# Patient Record
Sex: MALE | Race: WHITE | NOT HISPANIC OR LATINO | URBAN - METROPOLITAN AREA
[De-identification: names, ages, dates, MRNs, and addresses within clinical notes are randomized per-mention and may not be internally consistent; named-entity substitution may affect disease eponyms.]

---

## 2020-12-21 ENCOUNTER — EMERGENCY (EMERGENCY)
Facility: HOSPITAL | Age: 60
LOS: 0 days | Discharge: HOME | End: 2020-12-21
Attending: EMERGENCY MEDICINE | Admitting: EMERGENCY MEDICINE
Payer: COMMERCIAL

## 2020-12-21 VITALS
OXYGEN SATURATION: 98 % | RESPIRATION RATE: 20 BRPM | DIASTOLIC BLOOD PRESSURE: 104 MMHG | HEART RATE: 54 BPM | HEIGHT: 70 IN | SYSTOLIC BLOOD PRESSURE: 197 MMHG | WEIGHT: 197.09 LBS | TEMPERATURE: 96 F

## 2020-12-21 VITALS
HEART RATE: 52 BPM | DIASTOLIC BLOOD PRESSURE: 97 MMHG | SYSTOLIC BLOOD PRESSURE: 169 MMHG | OXYGEN SATURATION: 97 % | TEMPERATURE: 97 F | RESPIRATION RATE: 18 BRPM

## 2020-12-21 DIAGNOSIS — M25.512 PAIN IN LEFT SHOULDER: ICD-10-CM

## 2020-12-21 DIAGNOSIS — R07.9 CHEST PAIN, UNSPECIFIED: ICD-10-CM

## 2020-12-21 DIAGNOSIS — Z85.810 PERSONAL HISTORY OF MALIGNANT NEOPLASM OF TONGUE: Chronic | ICD-10-CM

## 2020-12-21 DIAGNOSIS — I10 ESSENTIAL (PRIMARY) HYPERTENSION: ICD-10-CM

## 2020-12-21 DIAGNOSIS — Z90.09 ACQUIRED ABSENCE OF OTHER PART OF HEAD AND NECK: Chronic | ICD-10-CM

## 2020-12-21 DIAGNOSIS — Z98.890 OTHER SPECIFIED POSTPROCEDURAL STATES: ICD-10-CM

## 2020-12-21 DIAGNOSIS — Z87.891 PERSONAL HISTORY OF NICOTINE DEPENDENCE: ICD-10-CM

## 2020-12-21 DIAGNOSIS — R04.0 EPISTAXIS: ICD-10-CM

## 2020-12-21 PROCEDURE — 30903 CONTROL OF NOSEBLEED: CPT

## 2020-12-21 PROCEDURE — 99284 EMERGENCY DEPT VISIT MOD MDM: CPT | Mod: 25

## 2020-12-21 PROCEDURE — 93010 ELECTROCARDIOGRAM REPORT: CPT

## 2020-12-21 RX ORDER — CLOPIDOGREL BISULFATE 75 MG/1
0 TABLET, FILM COATED ORAL
Qty: 0 | Refills: 0 | DISCHARGE

## 2020-12-21 NOTE — ED PROVIDER NOTE - CLINICAL SUMMARY MEDICAL DECISION MAKING FREE TEXT BOX
59 yo male PMH HTN, throat/neck cancer s/o resection, has vascular neck stent, taking ASA and Plavix c/o nose bleed for about 5 hours.  No associated complaints,, upon arrival in ED reported mild chest discomfort which has resolved spontaneously, denies any complaints at present.  ECG non-ischemic.  + bleeding form the left naris.  Nose was packed, patient was observed in ED for a few hours, no bleeding following packing.  Advised to follow up with ENT in 1-2 days, , strict return precautions given.  Patient verbalized understanding and is amenable with the plan.

## 2020-12-21 NOTE — ED ADULT NURSE NOTE - NSIMPLEMENTINTERV_GEN_ALL_ED
Implemented All Fall with Harm Risk Interventions:  [place] to call system. Call bell, personal items and telephone within reach. Instruct patient to call for assistance. Room bathroom lighting operational. Non-slip footwear when patient is off stretcher. Physically safe environment: no spills, clutter or unnecessary equipment. Stretcher in lowest position, wheels locked, appropriate side rails in place. Provide visual cue, wrist band, yellow gown, etc. Monitor gait and stability. Monitor for mental status changes and reorient to person, place, and time. Review medications for side effects contributing to fall risk. Reinforce activity limits and safety measures with patient and family. Provide visual clues: red socks.

## 2020-12-21 NOTE — ED PROVIDER NOTE - OBJECTIVE STATEMENT
60yM pmhx thyroid cancer that spread to throat/tongue s/p resection 2012, TIA and s/p carotid stent 11/2020 on ASA, plavix c/o nosebleed x5hrs; constant, stable; LT nostril>RT; began spontaneously no known inciting factor or digital trauma, states he attempted pressure, neosporin, hydrogen peroxide w/o relief. Feeling healthy and well prior to sx; denies fever/chills, n/v/d, abd pain, SOB.  Pt also states shortly PTA he developed pain in his chest that has since traveled to his LT shoulder, getting better, exacerbated by moving his arm, reproducible on palpation.

## 2020-12-21 NOTE — ED PROVIDER NOTE - PROGRESS NOTE DETAILS
AG: epistaxis controlled w/ rapid rhino LT nostril, no bleeding RT nostril, blood/clots cleared out of oropharynx w/ no further bleeding. Patient to be discharged from ED. Verbal instructions given, including instructions to return to ED immediately for any new, worsening, or concerning symptoms. Patient endorsed understanding. Written discharge instructions additionally given, including follow-up plan.  Patient was given opportunity to ask questions.

## 2020-12-21 NOTE — ED ADULT NURSE NOTE - OBJECTIVE STATEMENT
Patient presents to ER in [facility] complaining of nose bleed for the past 5 hours. Patient currently takes ASA and Plavix, had a mini stroke on 11/23/2020 in which patient had a carotid stent placed on 11/30/2020. Patient now complaining of sternal chest pain for the past 30 minutes prior to arrival. Patient presents to ER in [facility] complaining of nose bleed for the past 5 hours. Patient currently takes ASA and Plavix, had a mini stroke on 11/23/2020 in which patient had a carotid stent placed on 11/30/2020. Patient now complaining of sternal chest pain for the past 30 minutes prior to arrival. Patient unsure of all medications and medication dosages.

## 2020-12-21 NOTE — ED PROVIDER NOTE - PRO INTERPRETER NEED 2
TRANSFER - IN REPORT:    Verbal report received from [facility] RN(name) on [address]  being received from ED(unit) for routine progression of care      Report consisted of patients Situation, Background, Assessment and   Recommendations(SBAR). Information from the following report(s) SBAR, Kardex and ED Summary was reviewed with the receiving nurse. Opportunity for questions and clarification was provided. Assessment completed upon patients arrival to unit and care assumed. Dual skin assessment completed with James Iqbal RN:  Pt's skin generally c/d/i, no evidence of breakdown or pressure ulcers. Heels & sacrum intact. Pt bathed & placed on monitor, allevyn applied. NAD & VSS, will continue to monitor. English

## 2020-12-21 NOTE — ED ADULT TRIAGE NOTE - CHIEF COMPLAINT QUOTE
Pt reports nose bleed for 5 hours. On ASA and PLavix for mini stroke on 11/23. Stent placed right sided of neck 11/30. Pt now reports misternal chest pain starting 30 min prior to arrival

## 2020-12-21 NOTE — ED PROVIDER NOTE - ATTENDING CONTRIBUTION TO CARE
61 yo male PMH HTN, throat/neck cancer s/o resection, has vascular neck stent, taking ASA and Plavix c/o nose bleed for about 5 hours.  No associated complaints,, upon arrival in ED reported mild chest discomfort which has resolved spontaneously, denies any complaints at present.  ECG non-ischemic.  + bleeding form the left naris.  Nose was packed, patient was observed in ED for a few hours, no bleeding following packing.  Advised to follow up with ENT in 1-2 days, , strict return precautions given.  Patient verbalized understanding and is amenable with the plan.

## 2020-12-21 NOTE — ED PROVIDER NOTE - PATIENT PORTAL LINK FT
You can access the FollowMyHealth Patient Portal offered by [facility] by registering at the following website: [email or web address]. By joining [facility]’s FollowMyHealth portal, you will also be able to view your health information using other applications (apps) compatible with our system.

## 2020-12-21 NOTE — ED PROVIDER NOTE - NSFOLLOWUPINSTRUCTIONS_ED_ALL_ED_FT
Please follow up with the ENT doctor that did your surgery, or the one below, WITHIN 48 HOURS to have the Rapid Rhino removed. If you cannot follow up with an ENT doctor in 48 hours return to the emergency department to have it removed. Return to the emergency department sooner for any new or worsening symptoms.    Epistaxis (nosebleed)    Nosebleeds are common and can be caused by many conditions, such as injury, infections, dry mucous membranes or dry climate, medicines, nose picking, and home heating and cooling systems. Try controlling your nosebleed by pinching your nose continuously for at least 10 minutes. Avoid lying down while you are having a nosebleed. Sit up and lean forward. Avoid blowing or sniffing your nose for a number of hours after having a nosebleed. Resume your normal activities as you are able, but avoid straining, lifting, or bending at the waist for several days. Maintain humidity in your home by using less air conditioning or by using a humidifier.     If your nose was packed by your health care provider, try to maintain the pack inside of your nose until your health care provider removes it. If a balloon catheter was used to pack your nose, do not cut or remove it unless your health care provider has instructed you to do that.     Aspirin and blood thinners make bleeding more likely. If you are prescribed these medicines and you suffer from nosebleeds, ask your health care provider if you should stop taking the medicines or adjust the dose. Do not stop medicines unless directed by your health care provider     SEEK IMMEDIATE MEDICAL CARE IF YOU HAVE THE FOLLOWING SYMPTOMS: nosebleed lasting longer than 20 minutes, unusual bleeding from or bruising on other parts of your body, dizziness or lightheadedness, nosebleed occurring after a head injury, or fever.

## 2020-12-21 NOTE — ED PROVIDER NOTE - NS ED ROS FT
Review of Systems:  	•	CONSTITUTIONAL - no fever, no diaphoresis  	•	SKIN - no rash, no lesions  	•	HEMATOLOGIC - no bleeding, no bruising  	•	EYES - no discharge, no injection  	•	ENT - no sore throat, +epistaxis  	•	RESPIRATORY - no shortness of breath, no cough  	•	CARDIAC - no chest pain, no palpitations  	•	GI - no abd pain, no nausea, no vomiting, no diarrhea  	•	GENITO-URINARY - no dysuria, no hematuria  	•	MUSCULOSKELETAL - +shoulder pain, no muscle aches  	•	NEUROLOGIC - no dizziness, no headache

## 2020-12-21 NOTE — ED PROVIDER NOTE - PHYSICAL EXAMINATION
Vital Signs: Reviewed  GEN: alert, NAD, speaks full sentences  HEAD:  normocephalic, atraumatic  EYES:  PERRLA; conjunctivae without injection, drainage or discharge  ENMT:  active bleeding anterior LT nostril, no active bleeding RT nostril; mouth moist without ulcerations or lesions s/p surgical manipulation; posterior oropharynx w/o cascading bleeding  NECK:  supple  CARDIAC:  regular rate, normal S1 and S2, no murmurs  RESP:  respiratory rate and effort appear normal for age; lungs are clear to auscultation bilaterally; no rales or wheezes  ABDOMEN:  soft, nontender, nondistended  MUSCULOSKELETAL/NEURO: supraspinatus muscle tenderness; normal movement, normal tone  SKIN:  normal skin color for age and race, well-perfused; warm and dry

## 2020-12-21 NOTE — ED PROVIDER NOTE - CARE PROVIDER_API CALL
Lasha Glass (MD)  [facility]  [address], [address] [place] [address]  Phone: [phone]  Fax: [phone]  Follow Up Time: Urgent

## 2020-12-31 NOTE — ED PROCEDURE NOTE - CPROC ED POST PROC CARE GUIDE1
f/u ENT/Verbal/written post procedure instructions were given to patient/caregiver./Instructed patient/caregiver regarding signs and symptoms of infection.

## 2020-12-31 NOTE — ED PROCEDURE NOTE - CPROC ED NASAL DETAIL1
soaked in TXA/The source of the bleeding was clearly identified./The anatomic location was packed./The bleeding stopped.

## 2021-05-30 NOTE — ED PROVIDER NOTE - WET READ LAUNCH FT
EGD Operative Report  Patient Name: Atha Stage  YOB: 1962  MRN: [id number]  Procedure: Esophagogastroduodenoscopy (EGD)  With gastric biopsies  Pre-operative Diagnosis & Indication: ISABEL  Post-operative Diagnosis:   1.  Gastritis deep sedation was administered.  Once adequate sedation was achieved, a bite block was placed and a lubricated tip of an Olympus video upper endoscope was inserted through the oropharynx and gently manipulated through the esophagus into the stomach and the pylori. • Duodenum:   o The entire examined duodenum was normal.  Biopsies with cold forceps were obtained.      Impression: Upper GI source of ISABEL may be explained by gastritis, or erythema in the hernia, likely from mechanical etiology in setting of Par There are no Wet Read(s) to document.

## 2023-06-20 PROBLEM — C73 MALIGNANT NEOPLASM OF THYROID GLAND: Chronic | Status: ACTIVE | Noted: 2020-12-21

## 2023-06-27 PROBLEM — Z00.00 ENCOUNTER FOR PREVENTIVE HEALTH EXAMINATION: Status: ACTIVE | Noted: 2023-06-27

## 2023-06-30 ENCOUNTER — EMERGENCY (EMERGENCY)
Facility: HOSPITAL | Age: 63
LOS: 1 days | Discharge: HOME | End: 2023-06-30
Attending: STUDENT IN AN ORGANIZED HEALTH CARE EDUCATION/TRAINING PROGRAM | Admitting: STUDENT IN AN ORGANIZED HEALTH CARE EDUCATION/TRAINING PROGRAM
Payer: COMMERCIAL

## 2023-06-30 ENCOUNTER — NON-APPOINTMENT (OUTPATIENT)
Age: 63
End: 2023-06-30

## 2023-06-30 ENCOUNTER — APPOINTMENT (OUTPATIENT)
Dept: NEUROSURGERY | Facility: CLINIC | Age: 63
End: 2023-06-30
Payer: COMMERCIAL

## 2023-06-30 VITALS
TEMPERATURE: 98 F | HEIGHT: 70 IN | DIASTOLIC BLOOD PRESSURE: 82 MMHG | HEART RATE: 55 BPM | OXYGEN SATURATION: 96 % | RESPIRATION RATE: 17 BRPM | WEIGHT: 179.9 LBS | SYSTOLIC BLOOD PRESSURE: 180 MMHG

## 2023-06-30 VITALS
OXYGEN SATURATION: 95 % | RESPIRATION RATE: 16 BRPM | SYSTOLIC BLOOD PRESSURE: 182 MMHG | HEART RATE: 52 BPM | TEMPERATURE: 99 F | DIASTOLIC BLOOD PRESSURE: 89 MMHG

## 2023-06-30 VITALS
TEMPERATURE: 98.3 F | HEIGHT: 70 IN | DIASTOLIC BLOOD PRESSURE: 92 MMHG | WEIGHT: 180 LBS | BODY MASS INDEX: 25.77 KG/M2 | SYSTOLIC BLOOD PRESSURE: 164 MMHG | OXYGEN SATURATION: 97 % | HEART RATE: 56 BPM

## 2023-06-30 DIAGNOSIS — M50.223 OTHER CERVICAL DISC DISPLACEMENT AT C6-C7 LEVEL: ICD-10-CM

## 2023-06-30 DIAGNOSIS — R63.4 ABNORMAL WEIGHT LOSS: ICD-10-CM

## 2023-06-30 DIAGNOSIS — M47.812 SPONDYLOSIS WITHOUT MYELOPATHY OR RADICULOPATHY, CERVICAL REGION: ICD-10-CM

## 2023-06-30 DIAGNOSIS — M54.2 CERVICALGIA: ICD-10-CM

## 2023-06-30 DIAGNOSIS — Z85.810 PERSONAL HISTORY OF MALIGNANT NEOPLASM OF TONGUE: Chronic | ICD-10-CM

## 2023-06-30 DIAGNOSIS — Z98.890 OTHER SPECIFIED POSTPROCEDURAL STATES: Chronic | ICD-10-CM

## 2023-06-30 DIAGNOSIS — M43.13 SPONDYLOLISTHESIS, CERVICOTHORACIC REGION: ICD-10-CM

## 2023-06-30 DIAGNOSIS — Z85.810 PERSONAL HISTORY OF MALIGNANT NEOPLASM OF TONGUE: ICD-10-CM

## 2023-06-30 DIAGNOSIS — R63.0 ANOREXIA: ICD-10-CM

## 2023-06-30 DIAGNOSIS — R50.9 FEVER, UNSPECIFIED: ICD-10-CM

## 2023-06-30 DIAGNOSIS — M50.31 OTHER CERVICAL DISC DEGENERATION, HIGH CERVICAL REGION: ICD-10-CM

## 2023-06-30 DIAGNOSIS — M48.02 SPINAL STENOSIS, CERVICAL REGION: ICD-10-CM

## 2023-06-30 DIAGNOSIS — Z85.850 PERSONAL HISTORY OF MALIGNANT NEOPLASM OF THYROID: ICD-10-CM

## 2023-06-30 DIAGNOSIS — E11.9 TYPE 2 DIABETES MELLITUS WITHOUT COMPLICATIONS: ICD-10-CM

## 2023-06-30 DIAGNOSIS — Z90.09 ACQUIRED ABSENCE OF OTHER PART OF HEAD AND NECK: Chronic | ICD-10-CM

## 2023-06-30 LAB
ANION GAP SERPL CALC-SCNC: 11 MMOL/L — SIGNIFICANT CHANGE UP (ref 5–17)
APTT BLD: 38.6 SEC — HIGH (ref 27.5–35.5)
BASOPHILS # BLD AUTO: 0.05 K/UL — SIGNIFICANT CHANGE UP (ref 0–0.2)
BASOPHILS NFR BLD AUTO: 0.5 % — SIGNIFICANT CHANGE UP (ref 0–2)
BUN SERPL-MCNC: 14 MG/DL — SIGNIFICANT CHANGE UP (ref 7–23)
CALCIUM SERPL-MCNC: 10.1 MG/DL — SIGNIFICANT CHANGE UP (ref 8.4–10.5)
CHLORIDE SERPL-SCNC: 94 MMOL/L — LOW (ref 96–108)
CO2 SERPL-SCNC: 29 MMOL/L — SIGNIFICANT CHANGE UP (ref 22–31)
CREAT SERPL-MCNC: 0.75 MG/DL — SIGNIFICANT CHANGE UP (ref 0.5–1.3)
CRP SERPL-MCNC: 33.3 MG/L — HIGH (ref 0–4)
EGFR: 103 ML/MIN/1.73M2 — SIGNIFICANT CHANGE UP
EOSINOPHIL # BLD AUTO: 0.11 K/UL — SIGNIFICANT CHANGE UP (ref 0–0.5)
EOSINOPHIL NFR BLD AUTO: 1.1 % — SIGNIFICANT CHANGE UP (ref 0–6)
ERYTHROCYTE [SEDIMENTATION RATE] IN BLOOD: 51 MM/HR — HIGH
GLUCOSE SERPL-MCNC: 288 MG/DL — HIGH (ref 70–99)
HCT VFR BLD CALC: 42.5 % — SIGNIFICANT CHANGE UP (ref 39–50)
HGB BLD-MCNC: 14.2 G/DL — SIGNIFICANT CHANGE UP (ref 13–17)
IMM GRANULOCYTES NFR BLD AUTO: 0.3 % — SIGNIFICANT CHANGE UP (ref 0–0.9)
INR BLD: 1.07 — SIGNIFICANT CHANGE UP (ref 0.88–1.16)
LYMPHOCYTES # BLD AUTO: 1.31 K/UL — SIGNIFICANT CHANGE UP (ref 1–3.3)
LYMPHOCYTES # BLD AUTO: 12.8 % — LOW (ref 13–44)
MCHC RBC-ENTMCNC: 28.6 PG — SIGNIFICANT CHANGE UP (ref 27–34)
MCHC RBC-ENTMCNC: 33.4 GM/DL — SIGNIFICANT CHANGE UP (ref 32–36)
MCV RBC AUTO: 85.7 FL — SIGNIFICANT CHANGE UP (ref 80–100)
MONOCYTES # BLD AUTO: 1.08 K/UL — HIGH (ref 0–0.9)
MONOCYTES NFR BLD AUTO: 10.6 % — SIGNIFICANT CHANGE UP (ref 2–14)
NEUTROPHILS # BLD AUTO: 7.65 K/UL — HIGH (ref 1.8–7.4)
NEUTROPHILS NFR BLD AUTO: 74.7 % — SIGNIFICANT CHANGE UP (ref 43–77)
NRBC # BLD: 0 /100 WBCS — SIGNIFICANT CHANGE UP (ref 0–0)
PLATELET # BLD AUTO: 334 K/UL — SIGNIFICANT CHANGE UP (ref 150–400)
POTASSIUM SERPL-MCNC: 4.6 MMOL/L — SIGNIFICANT CHANGE UP (ref 3.5–5.3)
POTASSIUM SERPL-SCNC: 4.6 MMOL/L — SIGNIFICANT CHANGE UP (ref 3.5–5.3)
PROTHROM AB SERPL-ACNC: 12.7 SEC — SIGNIFICANT CHANGE UP (ref 10.5–13.4)
RBC # BLD: 4.96 M/UL — SIGNIFICANT CHANGE UP (ref 4.2–5.8)
RBC # FLD: 12.8 % — SIGNIFICANT CHANGE UP (ref 10.3–14.5)
SODIUM SERPL-SCNC: 134 MMOL/L — LOW (ref 135–145)
WBC # BLD: 10.23 K/UL — SIGNIFICANT CHANGE UP (ref 3.8–10.5)
WBC # FLD AUTO: 10.23 K/UL — SIGNIFICANT CHANGE UP (ref 3.8–10.5)

## 2023-06-30 PROCEDURE — 72125 CT NECK SPINE W/O DYE: CPT | Mod: MG

## 2023-06-30 PROCEDURE — 87040 BLOOD CULTURE FOR BACTERIA: CPT

## 2023-06-30 PROCEDURE — 72156 MRI NECK SPINE W/O & W/DYE: CPT | Mod: 26,MG

## 2023-06-30 PROCEDURE — G1004: CPT

## 2023-06-30 PROCEDURE — 99205 OFFICE O/P NEW HI 60 MIN: CPT

## 2023-06-30 PROCEDURE — 99284 EMERGENCY DEPT VISIT MOD MDM: CPT | Mod: 25

## 2023-06-30 PROCEDURE — 96374 THER/PROPH/DIAG INJ IV PUSH: CPT | Mod: XU

## 2023-06-30 PROCEDURE — 36415 COLL VENOUS BLD VENIPUNCTURE: CPT

## 2023-06-30 PROCEDURE — 99285 EMERGENCY DEPT VISIT HI MDM: CPT

## 2023-06-30 PROCEDURE — 80048 BASIC METABOLIC PNL TOTAL CA: CPT

## 2023-06-30 PROCEDURE — 72125 CT NECK SPINE W/O DYE: CPT | Mod: 26,MG

## 2023-06-30 PROCEDURE — 85610 PROTHROMBIN TIME: CPT

## 2023-06-30 PROCEDURE — 85025 COMPLETE CBC W/AUTO DIFF WBC: CPT

## 2023-06-30 PROCEDURE — 85652 RBC SED RATE AUTOMATED: CPT

## 2023-06-30 PROCEDURE — 96376 TX/PRO/DX INJ SAME DRUG ADON: CPT | Mod: XU

## 2023-06-30 PROCEDURE — 85730 THROMBOPLASTIN TIME PARTIAL: CPT

## 2023-06-30 PROCEDURE — A9585: CPT

## 2023-06-30 PROCEDURE — 72040 X-RAY EXAM NECK SPINE 2-3 VW: CPT | Mod: 26

## 2023-06-30 PROCEDURE — 72040 X-RAY EXAM NECK SPINE 2-3 VW: CPT

## 2023-06-30 PROCEDURE — 86140 C-REACTIVE PROTEIN: CPT

## 2023-06-30 PROCEDURE — 72156 MRI NECK SPINE W/O & W/DYE: CPT | Mod: MG

## 2023-06-30 RX ORDER — METHOCARBAMOL 500 MG/1
2 TABLET, FILM COATED ORAL
Qty: 40 | Refills: 0
Start: 2023-06-30 | End: 2023-07-04

## 2023-06-30 RX ORDER — MORPHINE SULFATE 50 MG/1
4 CAPSULE, EXTENDED RELEASE ORAL ONCE
Refills: 0 | Status: DISCONTINUED | OUTPATIENT
Start: 2023-06-30 | End: 2023-06-30

## 2023-06-30 RX ADMIN — MORPHINE SULFATE 4 MILLIGRAM(S): 50 CAPSULE, EXTENDED RELEASE ORAL at 17:00

## 2023-06-30 RX ADMIN — MORPHINE SULFATE 4 MILLIGRAM(S): 50 CAPSULE, EXTENDED RELEASE ORAL at 18:49

## 2023-06-30 RX ADMIN — MORPHINE SULFATE 4 MILLIGRAM(S): 50 CAPSULE, EXTENDED RELEASE ORAL at 13:58

## 2023-06-30 NOTE — ED ADULT NURSE NOTE - NSFALLUNIVINTERV_ED_ALL_ED
Bed/Stretcher in lowest position, wheels locked, appropriate side rails in place/Call bell, personal items and telephone in reach/Instruct patient to call for assistance before getting out of bed/chair/stretcher/Non-slip footwear applied when patient is off stretcher/[place] to call system/Physically safe environment - no spills, clutter or unnecessary equipment/Purposeful proactive rounding/Room/bathroom lighting operational, light cord in reach Bed/Stretcher in lowest position, wheels locked, appropriate side rails in place/Call bell, personal items and telephone in reach/Instruct patient to call for assistance before getting out of bed/chair/stretcher/Non-slip footwear applied when patient is off stretcher/[place] to call system/Physically safe environment - no spills, clutter or unnecessary equipment/Purposeful proactive rounding/Room/bathroom lighting operational, light cord in reach Bed/Stretcher in lowest position, wheels locked, appropriate side rails in place/Call bell, personal items and telephone in reach/Instruct patient to call for assistance before getting out of bed/chair/stretcher/Non-slip footwear applied when patient is off stretcher/[place] to call system/Physically safe environment - no spills, clutter or unnecessary equipment/Purposeful proactive rounding/Room/bathroom lighting operational, light cord in reach

## 2023-06-30 NOTE — CONSULT NOTE ADULT - SUBJECTIVE AND OBJECTIVE BOX
HISTORY OF PRESENT ILLNESS:   63 y/o M with pmh tongue cancer with thyroid metastases s/p resection 12 years ago, and DM presents with neck pain x 1 month. Patient reports that the pain started after he was working as a  and was stuck outside on a roof in windy and cold conditions. Patient reports that he had a fever after the incident as well. Patient denies trauma or injury. Pain is exacerbated with ROM of neck. Patient also admits to subjective fever 2-3 days ago, intermittent nausea x 3 days and over 20 lbs weight loss and loss of appetite since symptoms began. PT has been to OSH ED 2x, once on 6/2/23 and once on 6/3/23, had CTA head/neck which showed chronic lacunar infarcts within the basal ganglia and microvascular ischemic disease, no other acute findings. He had an MR C spine noncontrast on 6/12/23: which showed C3-4 mod canal stenosis and flattening of L ventral hemicord, C4-5 and C5-6 mild to mod canal stenosis and cord flattening, C6-7 slight effacement of thecal sac due to a shallow central disc protrusion, multilevel foraminal narrowing most significant at C3-4 on L and C4-5 on the R, impingement on respective exiting L C4 and R C5 nerve roots. Patient has also seen a neurologist and pain management provider outpatient. He reports that the only medication that he has taken that helps is oxycodone but it provides limited relief. He recieved a paraspinal muscular injection 2 weeks ago without relief and an epidural steroid injection 1.5 weeks ago that provided only temporary relief. Patient was referred to Dr. Triplett for further evaluation and upon arrival was directed to the ER for urgent imaging. Patient denies extremity numbness or weakness, gait imbalance, headache, vision changes, and radiation of pain. CT c-spine completed in ER.           PAST MEDICAL & SURGICAL HISTORY:  Tongue cancer      Diabetes      H/O tongue cancer        FAMILY HISTORY:      SOCIAL HISTORY:  unknown    Allergies    No Known Allergies    Intolerances        REVIEW OF SYSTEMS      General:	+recent weight loss    Skin/Breast:  intact  	  Ophthalmologic:  negative  	  ENMT:	negative    Respiratory and Thorax: no coughing, wheezing, recent URI  	  Cardiovascular: no chest pain, KEY    Gastrointestinal:	soft, non tender    Genitourinary: no frequency, dysuria    Musculoskeletal:	 +neck pain    Neurological:	see HPI    Psychiatric:	negative    Hematology/Lymphatics:	negative    Endocrine:  	negative    Allergic/Immunologic:  Negative      MEDICATIONS:  Antibiotics:    Neuro:    Anticoagulation:    OTHER:    IVF:      Vital Signs Last 24 Hrs  T(C): 36.7 (30 Jun 2023 11:28), Max: 36.7 (30 Jun 2023 11:28)  T(F): 98 (30 Jun 2023 11:28), Max: 98 (30 Jun 2023 11:28)  HR: 55 (30 Jun 2023 11:28) (55 - 55)  BP: 180/82 (30 Jun 2023 11:28) (180/82 - 180/82)  BP(mean): --  RR: 17 (30 Jun 2023 11:28) (17 - 17)  SpO2: 96% (30 Jun 2023 11:28) (96% - 96%)    Parameters below as of 30 Jun 2023 11:28  Patient On (Oxygen Delivery Method): room air        PHYSICAL EXAM:  General: patient seen laying supine in bed in NAD  Neuro: AAOx3, follows commands, opens eyes spontaneously, speech clear and fluent, CNII-XI grossly intact, face symmetric, no pronator drift,  strength 5/5 b/l upper extremities and lower extremities, sensation intact to light touch throughout  HEENT: PERRL, EOMI  Neck: supple, +midline tenderness to palpation of upper cervical spine, no paraspinal tenderness  Cardiac: RRR, S1S2  Pulmonary: chest rise symmetric  Abdomen: soft, nontender, nondistended  Ext: perfusing well  Skin: warm, dry        LABS:                        14.2   10.23 )-----------( 334      ( 30 Jun 2023 13:39 )             42.5     06-30    134<L>  |  94<L>  |  14  ----------------------------<  288<H>  4.6   |  29  |  0.75    Ca    10.1      30 Jun 2023 13:39      PT/INR - ( 30 Jun 2023 13:39 )   PT: 12.7 sec;   INR: 1.07          PTT - ( 30 Jun 2023 13:39 )  PTT:38.6 sec  Urinalysis Basic - ( 30 Jun 2023 13:39 )    Color: x / Appearance: x / SG: x / pH: x  Gluc: 288 mg/dL / Ketone: x  / Bili: x / Urobili: x   Blood: x / Protein: x / Nitrite: x   Leuk Esterase: x / RBC: x / WBC x   Sq Epi: x / Non Sq Epi: x / Bacteria: x      CULTURES:      RADIOLOGY & ADDITIONAL STUDIES:  < from: CT Cervical Spine No Cont (06.30.23 @ 14:16) >  No acute fracture or malalignment. Lucent lesions in the right lateral   mass of C1 and the tip of the dens is uncertain etiology. Recommend   further evaluation with dedicated MRI cervical spine with and without IV   contrast.    Multilevel cervical spondylosis most prominent at C3-4 with severe left   neural foraminal narrowing and mild to moderate spinal canal stenosis.   Additional mild to moderate degenerative changes as above.    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       Plan:  - Recommend MRI cervical spine with and without IV contrast and AP/lateral cervical spine xrays.    Pending discussion with Dr. Triplett   HISTORY OF PRESENT ILLNESS:   61 y/o M with pmh tongue cancer with thyroid metastases s/p resection 12 years ago, and DM presents with neck pain x 1 month. Patient reports that the pain started after he was working as a  and was stuck outside on a roof in windy and cold conditions. Patient reports that he had a fever after the incident as well. Patient denies trauma or injury. Pain is exacerbated with ROM of neck. Patient also admits to subjective fever 2-3 days ago, intermittent nausea x 3 days and over 20 lbs weight loss and loss of appetite since symptoms began. PT has been to OSH ED 2x, once on 6/2/23 and once on 6/3/23, had CTA head/neck which showed chronic lacunar infarcts within the basal ganglia and microvascular ischemic disease, no other acute findings. He had an MR C spine noncontrast on 6/12/23: which showed C3-4 mod canal stenosis and flattening of L ventral hemicord, C4-5 and C5-6 mild to mod canal stenosis and cord flattening, C6-7 slight effacement of thecal sac due to a shallow central disc protrusion, multilevel foraminal narrowing most significant at C3-4 on L and C4-5 on the R, impingement on respective exiting L C4 and R C5 nerve roots. Patient has also seen a neurologist and pain management provider outpatient. He reports that the only medication that he has taken that helps is oxycodone but it provides limited relief. He recieved a paraspinal muscular injection 2 weeks ago without relief and an epidural steroid injection 1.5 weeks ago that provided only temporary relief. Patient was referred to Dr. Triplett for further evaluation and upon arrival was directed to the ER for urgent imaging. Patient denies extremity numbness or weakness, gait imbalance, headache, vision changes, and radiation of pain. CT c-spine completed in ER.           PAST MEDICAL & SURGICAL HISTORY:  Tongue cancer      Diabetes      H/O tongue cancer        FAMILY HISTORY:      SOCIAL HISTORY:  unknown    Allergies    No Known Allergies    Intolerances        REVIEW OF SYSTEMS      General:	+recent weight loss    Skin/Breast:  intact  	  Ophthalmologic:  negative  	  ENMT:	negative    Respiratory and Thorax: no coughing, wheezing, recent URI  	  Cardiovascular: no chest pain, KEY    Gastrointestinal:	soft, non tender    Genitourinary: no frequency, dysuria    Musculoskeletal:	 +neck pain    Neurological:	see HPI    Psychiatric:	negative    Hematology/Lymphatics:	negative    Endocrine:  	negative    Allergic/Immunologic:  Negative      MEDICATIONS:  Antibiotics:    Neuro:    Anticoagulation:    OTHER:    IVF:      Vital Signs Last 24 Hrs  T(C): 36.7 (30 Jun 2023 11:28), Max: 36.7 (30 Jun 2023 11:28)  T(F): 98 (30 Jun 2023 11:28), Max: 98 (30 Jun 2023 11:28)  HR: 55 (30 Jun 2023 11:28) (55 - 55)  BP: 180/82 (30 Jun 2023 11:28) (180/82 - 180/82)  BP(mean): --  RR: 17 (30 Jun 2023 11:28) (17 - 17)  SpO2: 96% (30 Jun 2023 11:28) (96% - 96%)    Parameters below as of 30 Jun 2023 11:28  Patient On (Oxygen Delivery Method): room air        PHYSICAL EXAM:  General: patient seen laying supine in bed in NAD  Neuro: AAOx3, follows commands, opens eyes spontaneously, speech clear and fluent, CNII-XI grossly intact, face symmetric, no pronator drift,  strength 5/5 b/l upper extremities and lower extremities, sensation intact to light touch throughout  HEENT: PERRL, EOMI  Neck: supple, +midline tenderness to palpation of upper cervical spine, no paraspinal tenderness  Cardiac: RRR, S1S2  Pulmonary: chest rise symmetric  Abdomen: soft, nontender, nondistended  Ext: perfusing well  Skin: warm, dry        LABS:                        14.2   10.23 )-----------( 334      ( 30 Jun 2023 13:39 )             42.5     06-30    134<L>  |  94<L>  |  14  ----------------------------<  288<H>  4.6   |  29  |  0.75    Ca    10.1      30 Jun 2023 13:39      PT/INR - ( 30 Jun 2023 13:39 )   PT: 12.7 sec;   INR: 1.07          PTT - ( 30 Jun 2023 13:39 )  PTT:38.6 sec  Urinalysis Basic - ( 30 Jun 2023 13:39 )    Color: x / Appearance: x / SG: x / pH: x  Gluc: 288 mg/dL / Ketone: x  / Bili: x / Urobili: x   Blood: x / Protein: x / Nitrite: x   Leuk Esterase: x / RBC: x / WBC x   Sq Epi: x / Non Sq Epi: x / Bacteria: x      CULTURES:      RADIOLOGY & ADDITIONAL STUDIES:  < from: CT Cervical Spine No Cont (06.30.23 @ 14:16) >  No acute fracture or malalignment. Lucent lesions in the right lateral   mass of C1 and the tip of the dens is uncertain etiology. Recommend   further evaluation with dedicated MRI cervical spine with and without IV   contrast.    Multilevel cervical spondylosis most prominent at C3-4 with severe left   neural foraminal narrowing and mild to moderate spinal canal stenosis.   Additional mild to moderate degenerative changes as above.    < end of copied text >    Assessment:  61 y/o M with pmh tongue cancer with thyroid metastases s/p resection 12 years ago, and DM presents with neck pain x 1 month and intermittent fevers.       Plan:  - Recommend MRI cervical spine with and without IV contrast and AP/lateral cervical spine xrays.    Pending discussion with Dr. Triplett   HISTORY OF PRESENT ILLNESS:   63 y/o M with pmh tongue cancer with thyroid metastases s/p resection 12 years ago, and DM presents with neck pain x 1 month. Patient reports that the pain started after he was working as a  and was stuck outside on a roof in windy and cold conditions. Patient reports that he had a fever after the incident as well. Patient denies trauma or injury. Pain is exacerbated with ROM of neck. Patient also admits to subjective fever 2-3 days ago, intermittent nausea x 3 days and over 20 lbs weight loss and loss of appetite since symptoms began. PT has been to OSH ED 2x, once on 6/2/23 and once on 6/3/23, had CTA head/neck which showed chronic lacunar infarcts within the basal ganglia and microvascular ischemic disease, no other acute findings. He had an MR C spine noncontrast on 6/12/23: which showed C3-4 mod canal stenosis and flattening of L ventral hemicord, C4-5 and C5-6 mild to mod canal stenosis and cord flattening, C6-7 slight effacement of thecal sac due to a shallow central disc protrusion, multilevel foraminal narrowing most significant at C3-4 on L and C4-5 on the R, impingement on respective exiting L C4 and R C5 nerve roots. Patient has also seen a neurologist and pain management provider outpatient. He reports that the only medication that he has taken that helps is oxycodone but it provides limited relief. He recieved a paraspinal muscular injection 2 weeks ago without relief and an epidural steroid injection 1.5 weeks ago that provided only temporary relief. Patient was referred to Dr. Triplett for further evaluation and upon arrival was directed to the ER for urgent imaging. Patient denies extremity numbness or weakness, gait imbalance, headache, vision changes, and radiation of pain. CT c-spine completed in ER.           PAST MEDICAL & SURGICAL HISTORY:  Tongue cancer      Diabetes      H/O tongue cancer        FAMILY HISTORY:      SOCIAL HISTORY:  unknown    Allergies    No Known Allergies    Intolerances        REVIEW OF SYSTEMS      General:	+recent weight loss    Skin/Breast:  intact  	  Ophthalmologic:  negative  	  ENMT:	negative    Respiratory and Thorax: no coughing, wheezing, recent URI  	  Cardiovascular: no chest pain, KEY    Gastrointestinal:	soft, non tender    Genitourinary: no frequency, dysuria    Musculoskeletal:	 +neck pain    Neurological:	see HPI    Psychiatric:	negative    Hematology/Lymphatics:	negative    Endocrine:  	negative    Allergic/Immunologic:  Negative      MEDICATIONS:  Antibiotics:    Neuro:    Anticoagulation:    OTHER:    IVF:      Vital Signs Last 24 Hrs  T(C): 36.7 (30 Jun 2023 11:28), Max: 36.7 (30 Jun 2023 11:28)  T(F): 98 (30 Jun 2023 11:28), Max: 98 (30 Jun 2023 11:28)  HR: 55 (30 Jun 2023 11:28) (55 - 55)  BP: 180/82 (30 Jun 2023 11:28) (180/82 - 180/82)  BP(mean): --  RR: 17 (30 Jun 2023 11:28) (17 - 17)  SpO2: 96% (30 Jun 2023 11:28) (96% - 96%)    Parameters below as of 30 Jun 2023 11:28  Patient On (Oxygen Delivery Method): room air        PHYSICAL EXAM:  General: patient seen laying supine in bed in NAD  Neuro: AAOx3, follows commands, opens eyes spontaneously, speech clear and fluent, CNII-XI grossly intact, face symmetric, no pronator drift,  strength 5/5 b/l upper extremities and lower extremities, sensation intact to light touch throughout  HEENT: PERRL, EOMI  Neck: supple, +midline tenderness to palpation of upper cervical spine, no paraspinal tenderness  Cardiac: RRR, S1S2  Pulmonary: chest rise symmetric  Abdomen: soft, nontender, nondistended  Ext: perfusing well  Skin: warm, dry        LABS:                        14.2   10.23 )-----------( 334      ( 30 Jun 2023 13:39 )             42.5     06-30    134<L>  |  94<L>  |  14  ----------------------------<  288<H>  4.6   |  29  |  0.75    Ca    10.1      30 Jun 2023 13:39      PT/INR - ( 30 Jun 2023 13:39 )   PT: 12.7 sec;   INR: 1.07          PTT - ( 30 Jun 2023 13:39 )  PTT:38.6 sec  Urinalysis Basic - ( 30 Jun 2023 13:39 )    Color: x / Appearance: x / SG: x / pH: x  Gluc: 288 mg/dL / Ketone: x  / Bili: x / Urobili: x   Blood: x / Protein: x / Nitrite: x   Leuk Esterase: x / RBC: x / WBC x   Sq Epi: x / Non Sq Epi: x / Bacteria: x      CULTURES:      RADIOLOGY & ADDITIONAL STUDIES:  < from: CT Cervical Spine No Cont (06.30.23 @ 14:16) >  No acute fracture or malalignment. Lucent lesions in the right lateral   mass of C1 and the tip of the dens is uncertain etiology. Recommend   further evaluation with dedicated MRI cervical spine with and without IV   contrast.    Multilevel cervical spondylosis most prominent at C3-4 with severe left   neural foraminal narrowing and mild to moderate spinal canal stenosis.   Additional mild to moderate degenerative changes as above.    < end of copied text >  < from: MR Cervical Spine w/wo IV Cont (06.30.23 @ 16:47) >    IMPRESSION: Findings suggestive of inflammatory arthropathy involving C1   and C2 joint space and right atlantooccipital joint and clinical   correlation is recommended. Multilevel degenerative disc disease with   spinal stenosis with mild cord compression at C3/4 as detailed above.    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       Plan:  - Recommended CT cervical spine, MRI cervical spine with and without IV contrast and AP/lateral cervical spine xrays, imaging reviewed with Dr. Triplett.   - Recommend to send ESR, CRP and blood cultures. Please fit patient in a cervical hard collar and have him follow up outpatient with Dr. Triplett next Wednesday in his office. Please provide patient with a prescription for a muscle relaxer and pain medications.     Please contact neurosurgery PA with any questions or concerns: [phone]    D/w Dr. Triplett   HISTORY OF PRESENT ILLNESS:   61 y/o M with pmh tongue cancer with thyroid metastases s/p resection 12 years ago, and DM presents with neck pain x 1 month. Patient reports that the pain started after he was working as a  and was stuck outside on a roof in windy and cold conditions. Patient reports that he had a fever after the incident as well. Patient denies trauma or injury. Pain is exacerbated with ROM of neck. Patient also admits to subjective fever 2-3 days ago, intermittent nausea x 3 days and over 20 lbs weight loss and loss of appetite since symptoms began. PT has been to OSH ED 2x, once on 6/2/23 and once on 6/3/23, had CTA head/neck which showed chronic lacunar infarcts within the basal ganglia and microvascular ischemic disease, no other acute findings. He had an MR C spine noncontrast on 6/12/23: which showed C3-4 mod canal stenosis and flattening of L ventral hemicord, C4-5 and C5-6 mild to mod canal stenosis and cord flattening, C6-7 slight effacement of thecal sac due to a shallow central disc protrusion, multilevel foraminal narrowing most significant at C3-4 on L and C4-5 on the R, impingement on respective exiting L C4 and R C5 nerve roots. Patient has also seen a neurologist and pain management provider outpatient. He reports that the only medication that he has taken that helps is oxycodone but it provides limited relief. He recieved a paraspinal muscular injection 2 weeks ago without relief and an epidural steroid injection 1.5 weeks ago that provided only temporary relief. Patient was referred to Dr. Triplett for further evaluation and upon arrival was directed to the ER for urgent imaging. Patient denies extremity numbness or weakness, gait imbalance, headache, vision changes, and radiation of pain. CT c-spine completed in ER.           PAST MEDICAL & SURGICAL HISTORY:  Tongue cancer      Diabetes      H/O tongue cancer        FAMILY HISTORY:      SOCIAL HISTORY:  unknown    Allergies    No Known Allergies    Intolerances        REVIEW OF SYSTEMS      General:	+recent weight loss    Skin/Breast:  intact  	  Ophthalmologic:  negative  	  ENMT:	negative    Respiratory and Thorax: no coughing, wheezing, recent URI  	  Cardiovascular: no chest pain, KEY    Gastrointestinal:	soft, non tender    Genitourinary: no frequency, dysuria    Musculoskeletal:	 +neck pain    Neurological:	see HPI    Psychiatric:	negative    Hematology/Lymphatics:	negative    Endocrine:  	negative    Allergic/Immunologic:  Negative      MEDICATIONS:  Antibiotics:    Neuro:    Anticoagulation:    OTHER:    IVF:      Vital Signs Last 24 Hrs  T(C): 36.7 (30 Jun 2023 11:28), Max: 36.7 (30 Jun 2023 11:28)  T(F): 98 (30 Jun 2023 11:28), Max: 98 (30 Jun 2023 11:28)  HR: 55 (30 Jun 2023 11:28) (55 - 55)  BP: 180/82 (30 Jun 2023 11:28) (180/82 - 180/82)  BP(mean): --  RR: 17 (30 Jun 2023 11:28) (17 - 17)  SpO2: 96% (30 Jun 2023 11:28) (96% - 96%)    Parameters below as of 30 Jun 2023 11:28  Patient On (Oxygen Delivery Method): room air        PHYSICAL EXAM:  General: patient seen laying supine in bed in NAD  Neuro: AAOx3, follows commands, opens eyes spontaneously, speech clear and fluent, CNII-XI grossly intact, face symmetric, no pronator drift,  strength 5/5 b/l upper extremities and lower extremities, sensation intact to light touch throughout  HEENT: PERRL, EOMI  Neck: supple, +midline tenderness to palpation of upper cervical spine, no paraspinal tenderness  Cardiac: RRR, S1S2  Pulmonary: chest rise symmetric  Abdomen: soft, nontender, nondistended  Ext: perfusing well  Skin: warm, dry        LABS:                        14.2   10.23 )-----------( 334      ( 30 Jun 2023 13:39 )             42.5     06-30    134<L>  |  94<L>  |  14  ----------------------------<  288<H>  4.6   |  29  |  0.75    Ca    10.1      30 Jun 2023 13:39      PT/INR - ( 30 Jun 2023 13:39 )   PT: 12.7 sec;   INR: 1.07          PTT - ( 30 Jun 2023 13:39 )  PTT:38.6 sec  Urinalysis Basic - ( 30 Jun 2023 13:39 )    Color: x / Appearance: x / SG: x / pH: x  Gluc: 288 mg/dL / Ketone: x  / Bili: x / Urobili: x   Blood: x / Protein: x / Nitrite: x   Leuk Esterase: x / RBC: x / WBC x   Sq Epi: x / Non Sq Epi: x / Bacteria: x      CULTURES:      RADIOLOGY & ADDITIONAL STUDIES:  < from: CT Cervical Spine No Cont (06.30.23 @ 14:16) >  No acute fracture or malalignment. Lucent lesions in the right lateral   mass of C1 and the tip of the dens is uncertain etiology. Recommend   further evaluation with dedicated MRI cervical spine with and without IV   contrast.    Multilevel cervical spondylosis most prominent at C3-4 with severe left   neural foraminal narrowing and mild to moderate spinal canal stenosis.   Additional mild to moderate degenerative changes as above.    < end of copied text >  < from: MR Cervical Spine w/wo IV Cont (06.30.23 @ 16:47) >    IMPRESSION: Findings suggestive of inflammatory arthropathy involving C1   and C2 joint space and right atlantooccipital joint and clinical   correlation is recommended. Multilevel degenerative disc disease with   spinal stenosis with mild cord compression at C3/4 as detailed above.    < end of copied text >    Assessment:  61 y/o M with pmh tongue cancer with thyroid metastases s/p resection 12 years ago, and DM presents with neck pain x 1 month and intermittent fevers.       Plan:  - Recommended CT cervical spine, MRI cervical spine with and without IV contrast and AP/lateral cervical spine xrays, imaging reviewed with Dr. Triplett.   - Recommend to send ESR, CRP and blood cultures. Please fit patient in a cervical hard collar and have him follow up outpatient with Dr. Triplett next Wednesday in his office. Please provide patient with a prescription for a muscle relaxer and pain medications.     Please contact neurosurgery PA with any questions or concerns: [phone]    D/w Dr. Triplett   HISTORY OF PRESENT ILLNESS:   63 y/o M with pmh tongue cancer with thyroid metastases s/p resection 12 years ago, and DM presents with neck pain x 1 month. Patient reports that the pain started after he was working as a  and was stuck outside on a roof in windy and cold conditions. Patient reports that he had a fever after the incident as well. Patient denies trauma or injury. Pain is exacerbated with ROM of neck. Patient also admits to subjective fever 2-3 days ago, intermittent nausea x 3 days and over 20 lbs weight loss and loss of appetite since symptoms began. PT has been to OSH ED 2x, once on 6/2/23 and once on 6/3/23, had CTA head/neck which showed chronic lacunar infarcts within the basal ganglia and microvascular ischemic disease, no other acute findings. He had an MR C spine noncontrast on 6/12/23: which showed C3-4 mod canal stenosis and flattening of L ventral hemicord, C4-5 and C5-6 mild to mod canal stenosis and cord flattening, C6-7 slight effacement of thecal sac due to a shallow central disc protrusion, multilevel foraminal narrowing most significant at C3-4 on L and C4-5 on the R, impingement on respective exiting L C4 and R C5 nerve roots. Patient has also seen a neurologist and pain management provider outpatient. He reports that the only medication that he has taken that helps is oxycodone but it provides limited relief. He recieved a paraspinal muscular injection 2 weeks ago without relief and an epidural steroid injection 1.5 weeks ago that provided only temporary relief. Patient was referred to Dr. Triplett for further evaluation and upon arrival was directed to the ER for urgent imaging. Patient denies extremity numbness or weakness, gait imbalance, headache, vision changes, and radiation of pain. CT c-spine completed in ER.           PAST MEDICAL & SURGICAL HISTORY:  Tongue cancer      Diabetes      H/O tongue cancer        FAMILY HISTORY:      SOCIAL HISTORY:  unknown    Allergies    No Known Allergies    Intolerances        REVIEW OF SYSTEMS      General:	+recent weight loss    Skin/Breast:  intact  	  Ophthalmologic:  negative  	  ENMT:	negative    Respiratory and Thorax: no coughing, wheezing, recent URI  	  Cardiovascular: no chest pain, KEY    Gastrointestinal:	soft, non tender    Genitourinary: no frequency, dysuria    Musculoskeletal:	 +neck pain    Neurological:	see HPI    Psychiatric:	negative    Hematology/Lymphatics:	negative    Endocrine:  	negative    Allergic/Immunologic:  Negative      MEDICATIONS:  Antibiotics:    Neuro:    Anticoagulation:    OTHER:    IVF:      Vital Signs Last 24 Hrs  T(C): 36.7 (30 Jun 2023 11:28), Max: 36.7 (30 Jun 2023 11:28)  T(F): 98 (30 Jun 2023 11:28), Max: 98 (30 Jun 2023 11:28)  HR: 55 (30 Jun 2023 11:28) (55 - 55)  BP: 180/82 (30 Jun 2023 11:28) (180/82 - 180/82)  BP(mean): --  RR: 17 (30 Jun 2023 11:28) (17 - 17)  SpO2: 96% (30 Jun 2023 11:28) (96% - 96%)    Parameters below as of 30 Jun 2023 11:28  Patient On (Oxygen Delivery Method): room air        PHYSICAL EXAM:  General: patient seen laying supine in bed in NAD  Neuro: AAOx3, follows commands, opens eyes spontaneously, speech clear and fluent, CNII-XI grossly intact, face symmetric, no pronator drift,  strength 5/5 b/l upper extremities and lower extremities, sensation intact to light touch throughout  HEENT: PERRL, EOMI  Neck: supple, +midline tenderness to palpation of upper cervical spine, no paraspinal tenderness  Cardiac: RRR, S1S2  Pulmonary: chest rise symmetric  Abdomen: soft, nontender, nondistended  Ext: perfusing well  Skin: warm, dry        LABS:                        14.2   10.23 )-----------( 334      ( 30 Jun 2023 13:39 )             42.5     06-30    134<L>  |  94<L>  |  14  ----------------------------<  288<H>  4.6   |  29  |  0.75    Ca    10.1      30 Jun 2023 13:39      PT/INR - ( 30 Jun 2023 13:39 )   PT: 12.7 sec;   INR: 1.07          PTT - ( 30 Jun 2023 13:39 )  PTT:38.6 sec  Urinalysis Basic - ( 30 Jun 2023 13:39 )    Color: x / Appearance: x / SG: x / pH: x  Gluc: 288 mg/dL / Ketone: x  / Bili: x / Urobili: x   Blood: x / Protein: x / Nitrite: x   Leuk Esterase: x / RBC: x / WBC x   Sq Epi: x / Non Sq Epi: x / Bacteria: x      CULTURES:      RADIOLOGY & ADDITIONAL STUDIES:  < from: CT Cervical Spine No Cont (06.30.23 @ 14:16) >  No acute fracture or malalignment. Lucent lesions in the right lateral   mass of C1 and the tip of the dens is uncertain etiology. Recommend   further evaluation with dedicated MRI cervical spine with and without IV   contrast.    Multilevel cervical spondylosis most prominent at C3-4 with severe left   neural foraminal narrowing and mild to moderate spinal canal stenosis.   Additional mild to moderate degenerative changes as above.    < end of copied text >  < from: MR Cervical Spine w/wo IV Cont (06.30.23 @ 16:47) >    IMPRESSION: Findings suggestive of inflammatory arthropathy involving C1   and C2 joint space and right atlantooccipital joint and clinical   correlation is recommended. Multilevel degenerative disc disease with   spinal stenosis with mild cord compression at C3/4 as detailed above.    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       Plan:  - Recommended CT cervical spine, MRI cervical spine with and without IV contrast and AP/lateral cervical spine xrays, imaging reviewed with Dr. Triplett.   - Recommend to send ESR, CRP and blood cultures. Please fit patient in a cervical hard collar and have him follow up outpatient with Dr. Triplett next Wednesday in his office. Please provide patient with a prescription for a muscle relaxer and pain medications.     Please contact neurosurgery PA with any questions or concerns: [phone]    D/w Dr. Triplett

## 2023-06-30 NOTE — ED PROVIDER NOTE - NSFOLLOWUPINSTRUCTIONS_ED_ALL_ED_FT
Yes Thank you for visiting [facility] Emergency Department.      We saw you today for neck pain.  Please wear hard cervical collar at all times until you see neurosurgeon, Dr. Triplett again.  He would like to see you on Wednesday 7/5/23.  Please call his office to confirm time.    You may call [phone] (ask for Carrie) at [facility] [employer] - [address] [place] [phone] to try and arrange getting a cervical collar that is more properly fitted to your body if the collar you are wearing is uncomfortable.  They can see you on Monday.    You may try muscle relaxant as prescribed and as directed.  Please be careful mixing this with any remaining Oxycodone you have as this can also make you feel sleepy.     Additionally you may take ibuprofen (Motrin, Advil) 600 mg (3 regular tablets) every 6 hours as needed for pain.  Please take with food.  Stop taking if you develop abdominal pain, dark/ bloody stools.  Do not mix with other NSAIDS (ie. Naproxen, Aleve, Celecoxib).  You may also take acetaminophen (Tylenol) 650-975mg (2-3 regular tablets) or 500-1000mg (1-2 extra strength tablets) every 6 hours as needed for pain.  Do not exceed 4000 mg in 1 day. These medications may be bought over the counter.    I recommend alternating the Ibuprofen and Tylenol so you are getting medications around the clock.  For example take the Ibuprofen, then 3 hours later take the Tylenol, then 3 hours later take the Ibuprofen, and repeat as needed.    Please know that no emergency visit is complete without follow-up with your primary care provider in 1 week.  Please bring copies of all discharge papers and results and show to your doctor.      Please continue taking all previous medications as instructed unless we discussed otherwise.     I appreciated your patience and hope you feel better soon.     Return to ER immediately if you develop fevers, chills, chest pain, shortness of breath, worsening and/or any concerning symptoms. Thank you for visiting [facility] Emergency Department.      We saw you today for neck pain.  Please wear hard cervical collar at all times until you see neurosurgeon, Dr. Triplett again.  He would like to see you on Wednesday 7/5/23.  Please call his office to confirm time.    You may call [phone] (ask for Carrie) at [facility] [employer] - [address] [place] [phone] to try and arrange getting a cervical collar that is more properly fitted to your body if the collar you are wearing is uncomfortable.  They can see you on Monday.    You may try muscle relaxant as prescribed and as directed.  Please be careful mixing this with any remaining Oxycodone you have as this can also make you feel sleepy.     Additionally you may take ibuprofen (Motrin, Advil) 600 mg (3 regular tablets) every 6 hours as needed for pain.  Please take with food.  Stop taking if you develop abdominal pain, dark/ bloody stools.  Do not mix with other NSAIDS (ie. Naproxen, Aleve, Celecoxib).  You may also take acetaminophen (Tylenol) 650-975mg (2-3 regular tablets) or 500-1000mg (1-2 extra strength tablets) every 6 hours as needed for pain.  Do not exceed 4000 mg in 1 day. These medications may be bought over the counter.    I recommend alternating the Ibuprofen and Tylenol so you are getting medications around the clock.  For example take the Ibuprofen, then 3 hours later take the Tylenol, then 3 hours later take the Ibuprofen, and repeat as needed.    Please know that no emergency visit is complete without follow-up with your primary care provider in 1 week.  Please bring copies of all discharge papers and results and show to your doctor.      Please continue taking all previous medications as instructed unless we discussed otherwise.     I appreciated your patience and hope you feel better soon.     Return to ER immediately if you develop fevers, chills, chest pain, shortness of breath, worsening and/or any concerning symptoms. Thank you for visiting [facility] Emergency Department.      We saw you today for neck pain.  Please wear hard cervical collar at all times until you see neurosurgeon, Dr. Triplett again.  He would like to see you on Wednesday 7/5/23.  Please call his office to confirm time.    You may call [phone] (ask for Carrie) at [facility] [employer] - [address] [place] [address] to try and arrange getting a cervical collar that is more properly fitted to your body if the collar you are wearing is uncomfortable.  They can see you on Monday.    You may try muscle relaxant as prescribed and as directed.  Please be careful mixing this with any remaining Oxycodone you have as this can also make you feel sleepy.     Additionally you may take ibuprofen (Motrin, Advil) 600 mg (3 regular tablets) every 6 hours as needed for pain.  Please take with food.  Stop taking if you develop abdominal pain, dark/ bloody stools.  Do not mix with other NSAIDS (ie. Naproxen, Aleve, Celecoxib).  You may also take acetaminophen (Tylenol) 650-975mg (2-3 regular tablets) or 500-1000mg (1-2 extra strength tablets) every 6 hours as needed for pain.  Do not exceed 4000 mg in 1 day. These medications may be bought over the counter.    I recommend alternating the Ibuprofen and Tylenol so you are getting medications around the clock.  For example take the Ibuprofen, then 3 hours later take the Tylenol, then 3 hours later take the Ibuprofen, and repeat as needed.    Please know that no emergency visit is complete without follow-up with your primary care provider in 1 week.  Please bring copies of all discharge papers and results and show to your doctor.      Please continue taking all previous medications as instructed unless we discussed otherwise.     I appreciated your patience and hope you feel better soon.     Return to ER immediately if you develop fevers, chills, chest pain, shortness of breath, worsening and/or any concerning symptoms.

## 2023-06-30 NOTE — ED ADULT TRIAGE NOTE - CHIEF COMPLAINT QUOTE
Pt sent by MD Triplett for C spine imaging- MRI with contrast, CT. Pt reports having neck pain x Memorial Day weekend. Denies numbness/tingling, injury. PMH DM. Last took oxycodone at 5am.

## 2023-06-30 NOTE — ED PROVIDER NOTE - NS ED ROS FT
CONSTITUTIONAL: +fever, since resolved    RESPIRATORY: Denies SOB    CARDIOVASCULAR: Denies  chest pain.    GASTROINTESTINAL: Denies abdominal pain, nausea, vomiting and diarrhea.    MUSCULOSKELETAL: See HPI

## 2023-06-30 NOTE — ED PROVIDER NOTE - CLINICAL SUMMARY MEDICAL DECISION MAKING FREE TEXT BOX
61 y/o male w/ hx tongue ca with mets to thyroid s/p resection 12 yrs ago, DM p/w severe persistent neck pain x approx 1 mo.  Denies known trauma/ injury.  Notes thinks had fever x 2-3 days 1 mo ago, with symptom onset, but none since.  Has been to ED 2x, once on 6/2/23 and once on 6/3/23, had CTA head/neck which showed chronic lacunar infarcts within the basal ganglia and microvascular ischemic disease, no other acute findings.    Had MR C spine noncontrast on 6/12/23: which showed C3-4 mod canal stenosis and flattening of L ventral hemicord, C4-5 and C5-6 mild to mod canal stenosis and cord flattening, C6-7 slight effacement of thecal sac due to a shallow central disc protrusion, multilevel foraminal narrowing most significant at C3-4 on L and C4-5 on the R, impingement on respective exiting L C4 and R C5 nerve roots.  Pt saw pain mgmt and neuro, states had epidural injection on 6/27/23 without relief.  Has been taking Oxycodone 20 mg q6h without relief.  Saw neurosurgeon, Dr. Triplett, today who sent pt to ED requesting MR C spine with contrast, CT cervical spine, and AP/lat urgent xr.  Pt denies cp, sob, abd pain, n/v, numbness/tingling/weakness to ext.  Has reportedly lost 20 lbs over course of this past mo.    VS notable for /82, HR 55.  Afebrile  Pt appears uncomfortable.  Limited ROM to neck.  +generalized ttp to neck.  Reassuring neuro exam.  No appreciable deficits    Plan for preop labs, will consult neurosx  Plan to obtain imaging as requested by neurosx team  Pain meds, re-eval 61 y/o male w/ hx tongue ca with mets to thyroid s/p resection 12 yrs ago, DM p/w severe persistent neck pain x approx 1 mo.  Denies known trauma/ injury.  Notes thinks had fever x 2-3 days 1 mo ago, with symptom onset, but none since.  Has been to ED 2x, once on 6/2/23 and once on 6/3/23, had CTA head/neck which showed chronic lacunar infarcts within the basal ganglia and microvascular ischemic disease, no other acute findings.    Had MR C spine noncontrast on 6/12/23: which showed C3-4 mod canal stenosis and flattening of L ventral hemicord, C4-5 and C5-6 mild to mod canal stenosis and cord flattening, C6-7 slight effacement of thecal sac due to a shallow central disc protrusion, multilevel foraminal narrowing most significant at C3-4 on L and C4-5 on the R, impingement on respective exiting L C4 and R C5 nerve roots.  Pt saw pain mgmt and neuro, states had epidural injection on 6/27/23 without relief.  Has been taking Oxycodone 20 mg q6h without relief.  Saw neurosurgeon, Dr. Triplett, today who sent pt to ED requesting MR C spine with contrast, CT cervical spine, and AP/lat urgent xr.  Pt denies cp, sob, abd pain, n/v, numbness/tingling/weakness to ext.  Has reportedly lost 20 lbs over course of this past mo.    VS notable for /82, HR 55.  Afebrile  Pt appears uncomfortable.  Limited ROM to neck.  +generalized ttp to neck.  Reassuring neuro exam.  No appreciable deficits    Plan for preop labs, will consult neurosx  Plan to obtain imaging as requested by neurosx team  Pain meds, re-eval  --  Imaging reviewed by neurosurgery team/ Dr. Triplett.  Per NS recs - advised pt to be placed in hard C collar, addon ESR/CRP/blood cx, and will f/u with Dr. Triplett on Wed 7/5/23.  Pt placed in C-collar, pt feels comfortable and better in collar.  Discussed results with pt and instructions.  Pt given info to get C-collar fitted for him- advised to go to office on Monday for fit.  Pt feels comfortable with plan, will DC with stric return precautions. 63 y/o male w/ hx tongue ca with mets to thyroid s/p resection 12 yrs ago, DM p/w severe persistent neck pain x approx 1 mo.  Denies known trauma/ injury.  Notes thinks had fever x 2-3 days 1 mo ago, with symptom onset, but none since.  Has been to ED 2x, once on 6/2/23 and once on 6/3/23, had CTA head/neck which showed chronic lacunar infarcts within the basal ganglia and microvascular ischemic disease, no other acute findings.    Had MR C spine noncontrast on 6/12/23: which showed C3-4 mod canal stenosis and flattening of L ventral hemicord, C4-5 and C5-6 mild to mod canal stenosis and cord flattening, C6-7 slight effacement of thecal sac due to a shallow central disc protrusion, multilevel foraminal narrowing most significant at C3-4 on L and C4-5 on the R, impingement on respective exiting L C4 and R C5 nerve roots.  Pt saw pain mgmt and neuro, states had epidural injection on 6/27/23 without relief.  Has been taking Oxycodone 20 mg q6h without relief.  Saw neurosurgeon, Dr. Triplett, today who sent pt to ED requesting MR C spine with contrast, CT cervical spine, and AP/lat urgent xr.  Pt denies cp, sob, abd pain, n/v, numbness/tingling/weakness to ext.  Has reportedly lost 20 lbs over course of this past mo.    VS notable for /82, HR 55.  Afebrile  Pt appears uncomfortable.  Limited ROM to neck.  +generalized ttp to neck.  Reassuring neuro exam.  No appreciable deficits    Plan for preop labs, will consult neurosx  Plan to obtain imaging as requested by neurosx team  Pain meds, re-eval  --  Imaging reviewed by neurosurgery team/ Dr. Triplett.  Per NS recs - advised pt to be placed in hard C collar, addon ESR/CRP/blood cx, and will f/u with Dr. Triplett on Wed 7/5/23.  Pt placed in C-collar, pt feels comfortable and better in collar.  Discussed results with pt and instructions.  Pt given info to get C-collar fitted for him- advised to go to office on Monday for fit.  Pt feels comfortable with plan, will DC with stric return precautions.

## 2023-06-30 NOTE — ED PROVIDER NOTE - PHYSICAL EXAMINATION
CONSTITUTIONAL: Awake, alert.  Appears uncomfortable/ in pain    HEAD: Normocephalic, atraumatic.    EYES: Conjunctivae clear without exudates or hemorrhage. Sclera is non-icteric.    ENT: Normal appearing external ears, nose, mucous membranes moist.    NECK: supple, trachea midline.  Limited ROM present, generalized ttp present to neck    LUNGS:  No acute respiratory distress.  Non-tachypneic and non-labored.  Speaking in clear full sentences    MUSCULOSKELETAL:  Normal appearing extremities without obvious deformity, rash, ecchymosis, erythema.  No swelling.  Warm. No focal tenderness.  FROM b/l upper and lower extremities.  5/5 strength b/l upper and lower ext.  Sensation and motor function grossly intact.  Strong equal peripheral pulses b/l.   Cap refill < 2 b/l upper and lower ext.  All compartments soft.    SKIN: Skin in warm, dry and intact without rashes or lesions.  Appropriate color for ethnicity.    NEUROLOGICAL:  Awake, alert and oriented x 3.  Normal speech and cognition.  Face symmetric with equal sensation to light touch throughout, PERRL, EOMI, no nystagmus, hearing grossly equal and intact b/l, no tongue deviation, No gross motor deficit, 5/5 strength throughout, no gross sensory loss to light touch b/l upper and lower ext, normal movement.  No dysmetria on finger to nose testing.  Neg pronator drift.

## 2023-06-30 NOTE — ED ADULT NURSE REASSESSMENT NOTE - NS ED NURSE REASSESS COMMENT FT1
c collar applied per PA Hellerman orders pt medicated per orders and discussion about plan for dc and follow up took place

## 2023-06-30 NOTE — ED PROVIDER NOTE - OBJECTIVE STATEMENT
61 y/o male w/ hx tongue ca with mets to thyroid s/p resection 12 yrs ago, DM p/w severe persistent neck pain x approx 1 mo.  Denies known trauma/ injury.  Notes thinks had fever x 2-3 days 1 mo ago, with symptom onset, but none since.  Has been to ED 2x, once on 6/2/23 and once on 6/3/23, had CTA head/neck which showed chronic lacunar infarcts within the basal ganglia and microvascular ischemic disease, no other acute findings.    Had MR C spine noncontrast on 6/12/23: which showed C3-4 mod canal stenosis and flattening of L ventral hemicord, C4-5 and C5-6 mild to mod canal stenosis and cord flattening, C6-7 slight effacement of thecal sac due to a shallow central disc protrusion, multilevel foraminal narrowing most significant at C3-4 on L and C4-5 on the R, impingement on respective exiting L C4 and R C5 nerve roots.  Pt saw pain mgmt and neuro, states had epidural injection on 6/27/23 without relief.  Has been taking Oxycodone 20 mg q6h without relief.  Saw neurosurgeon, Dr. Triplett, today who sent pt to ED requesting MR C spine with contrast, CT cervical spine, and AP/lat urgent xr.  Pt denies cp, sob, abd pain, n/v, numbness/tingling/weakness to ext.  Has reportedly lost 20 lbs over course of this past mo. 63 y/o male w/ hx tongue ca with mets to thyroid s/p resection 12 yrs ago, DM p/w severe persistent neck pain x approx 1 mo.  Denies known trauma/ injury.  Notes thinks had fever x 2-3 days 1 mo ago, with symptom onset, but none since.  Has been to ED 2x, once on 6/2/23 and once on 6/3/23, had CTA head/neck which showed chronic lacunar infarcts within the basal ganglia and microvascular ischemic disease, no other acute findings.    Had MR C spine noncontrast on 6/12/23: which showed C3-4 mod canal stenosis and flattening of L ventral hemicord, C4-5 and C5-6 mild to mod canal stenosis and cord flattening, C6-7 slight effacement of thecal sac due to a shallow central disc protrusion, multilevel foraminal narrowing most significant at C3-4 on L and C4-5 on the R, impingement on respective exiting L C4 and R C5 nerve roots.  Pt saw pain mgmt and neuro, states had epidural injection on 6/27/23 without relief.  Has been taking Oxycodone 20 mg q6h without relief.  Saw neurosurgeon, Dr. Triplett, today who sent pt to ED requesting MR C spine with contrast, CT cervical spine, and AP/lat urgent xr.  Pt denies cp, sob, abd pain, n/v, numbness/tingling/weakness to ext.  Has reportedly lost 20 lbs over course of this past mo. 61 y/o male w/ hx tongue ca with mets to thyroid s/p resection 12 yrs ago, DM p/w severe persistent neck pain x approx 1 mo.  Denies known trauma/ injury.  Notes thinks had fever x 2-3 days 1 mo ago, with symptom onset, but none since.  Has been to OSH ED 2x, once on 6/2/23 and once on 6/3/23, had CTA head/neck which showed chronic lacunar infarcts within the basal ganglia and microvascular ischemic disease, no other acute findings.    Had MR C spine noncontrast on 6/12/23: which showed C3-4 mod canal stenosis and flattening of L ventral hemicord, C4-5 and C5-6 mild to mod canal stenosis and cord flattening, C6-7 slight effacement of thecal sac due to a shallow central disc protrusion, multilevel foraminal narrowing most significant at C3-4 on L and C4-5 on the R, impingement on respective exiting L C4 and R C5 nerve roots.  Pt saw pain mgmt and neuro, states had epidural injection on 6/27/23 without relief.  Has been taking Oxycodone 20 mg q6h without relief.  Saw neurosurgeon, Dr. Triplett, today who sent pt to ED requesting MR C spine with contrast, CT cervical spine, and AP/lat urgent xr.  Pt denies cp, sob, abd pain, n/v, numbness/tingling/weakness to ext.  Has reportedly lost 20 lbs over course of this past mo. 63 y/o male w/ hx tongue ca with mets to thyroid s/p resection 12 yrs ago, DM p/w severe persistent neck pain x approx 1 mo.  Denies known trauma/ injury.  Notes thinks had fever x 2-3 days 1 mo ago, with symptom onset, but none since.  Has been to OSH ED 2x, once on 6/2/23 and once on 6/3/23, had CTA head/neck which showed chronic lacunar infarcts within the basal ganglia and microvascular ischemic disease, no other acute findings.    Had MR C spine noncontrast on 6/12/23: which showed C3-4 mod canal stenosis and flattening of L ventral hemicord, C4-5 and C5-6 mild to mod canal stenosis and cord flattening, C6-7 slight effacement of thecal sac due to a shallow central disc protrusion, multilevel foraminal narrowing most significant at C3-4 on L and C4-5 on the R, impingement on respective exiting L C4 and R C5 nerve roots.  Pt saw pain mgmt and neuro, states had epidural injection on 6/27/23 without relief.  Has been taking Oxycodone 20 mg q6h without relief.  Saw neurosurgeon, Dr. Triplett, today who sent pt to ED requesting MR C spine with contrast, CT cervical spine, and AP/lat urgent xr.  Pt denies cp, sob, abd pain, n/v, numbness/tingling/weakness to ext.  Has reportedly lost 20 lbs over course of this past mo.

## 2023-06-30 NOTE — ED PROVIDER NOTE - PATIENT PORTAL LINK FT
You can access the FollowMyHealth Patient Portal offered by [facility] by registering at the following website: [email or web address] By joining [employer]’s FollowMyHealth portal, you will also be able to view your health information using other applications (apps) compatible with our system. You can access the FollowMyHealth Patient Portal offered by [facility] by registering at the following website: [email or web address] By joining [employer]’s FollowMyHealth portal, you will also be able to view your health information using other applications (apps) compatible with our system. You can access the FollowMyHealth Patient Portal offered by [facility] by registering at the following website: [email or web address] By joining [employer]’s FollowMyHealth portal, you will also be able to view your health information using other applications (apps) compatible with our system.

## 2023-06-30 NOTE — ED ADULT NURSE NOTE - OBJECTIVE STATEMENT
63 y/o M presents to the ED from his neurologist c/o neck pain with paper stating "go to ED for MRI with IV contrast". Pt is a poor historian. Per pt, he had a recent MRI without contrast that was normal and has had neck pain for months. Pt c/o pain in exam stating last oxycodone was at 5am and has worn off. On exam, A&Ox4, NAD, ambulatory on RA. Pt "stiff-like" in exam, not bending back or moving neck. 61 y/o M presents to the ED from his neurologist c/o neck pain with paper stating "go to ED for MRI with IV contrast". Pt is a poor historian. Per pt, he had a recent MRI without contrast that was normal and has had neck pain for months. Pt c/o pain in exam stating last oxycodone was at 5am and has worn off. On exam, A&Ox4, NAD, ambulatory on RA. Pt "stiff-like" in exam, not bending back or moving neck.

## 2023-06-30 NOTE — ED PROVIDER NOTE - NS ED ATTENDING STATEMENT MOD
This was a shared visit with the [facility]. I reviewed and verified the documentation and independently performed the documented:

## 2023-06-30 NOTE — ED ADULT NURSE REASSESSMENT NOTE - NS ED NURSE REASSESS COMMENT FT1
pt returned from MRI no distress noted at this time. re-positioned in chair for comfort, VSS, pending MRI results family at bedside both updated on plan of care and expectations managed. reports improved pain but still present at 5/10

## 2023-07-01 PROBLEM — M54.2 NECK PAIN, BILATERAL: Status: ACTIVE | Noted: 2023-07-01

## 2023-07-01 RX ORDER — ATORVASTATIN CALCIUM 20 MG/1
20 TABLET, FILM COATED ORAL
Refills: 0 | Status: ACTIVE | COMMUNITY

## 2023-07-01 RX ORDER — LEVOTHYROXINE SODIUM 137 UG/1
TABLET ORAL
Refills: 0 | Status: ACTIVE | COMMUNITY

## 2023-07-01 RX ORDER — GLIPIZIDE 2.5 MG/1
TABLET ORAL
Refills: 0 | Status: ACTIVE | COMMUNITY

## 2023-07-01 NOTE — PHYSICAL EXAM
[General Appearance - Alert] : alert [Oriented To Time, Place, And Person] : oriented to person, place, and time [Person] : oriented to person [Place] : oriented to place [Time] : oriented to time [Cranial Nerves Optic (II)] : visual acuity intact bilaterally,  pupils equal round and reactive to light [Cranial Nerves Trigeminal (V)] : facial sensation intact symmetrically [Cranial Nerves Facial (VII)] : face symmetrical [Cranial Nerves Vestibulocochlear (VIII)] : hearing was intact bilaterally [Motor Tone] : muscle tone was normal in all four extremities [Motor Strength] : muscle strength was normal in all four extremities [Balance] : balance was intact [C-Spine ___ (level)] : ~Ulevel [unfilled] cervical spine [Muscle Spasms, Bilateral] : bilateral muscle spasms [Restricted] : was restricted [Pain] : was painful [PERRL With Normal Accommodation] : pupils were equal in size, round, reactive to light, with normal accommodation [Full Visual Field] : full visual field [] : no respiratory distress [Heart Rate And Rhythm] : heart rate was normal and rhythm regular [Abnormal Walk] : normal gait [Skin Color & Pigmentation] : normal skin color and pigmentation

## 2023-07-01 NOTE — REVIEW OF SYSTEMS
[Hypersensitivity] : hypersensitivity [As Noted in HPI] : as noted in HPI [Joint Pain] : joint pain [Joint Stiffness] : joint stiffness [Negative] : Integumentary

## 2023-07-05 ENCOUNTER — APPOINTMENT (OUTPATIENT)
Dept: NEUROSURGERY | Facility: CLINIC | Age: 63
End: 2023-07-05
Payer: COMMERCIAL

## 2023-07-05 ENCOUNTER — APPOINTMENT (OUTPATIENT)
Dept: NEUROSURGERY | Facility: HOSPITAL | Age: 63
End: 2023-07-05

## 2023-07-05 ENCOUNTER — NON-APPOINTMENT (OUTPATIENT)
Age: 63
End: 2023-07-05

## 2023-07-05 VITALS
HEART RATE: 54 BPM | SYSTOLIC BLOOD PRESSURE: 130 MMHG | DIASTOLIC BLOOD PRESSURE: 80 MMHG | TEMPERATURE: 97.3 F | OXYGEN SATURATION: 65 %

## 2023-07-05 DIAGNOSIS — E78.5 HYPERLIPIDEMIA, UNSPECIFIED: ICD-10-CM

## 2023-07-05 DIAGNOSIS — C80.1 MALIGNANT (PRIMARY) NEOPLASM, UNSPECIFIED: ICD-10-CM

## 2023-07-05 DIAGNOSIS — E11.9 TYPE 2 DIABETES MELLITUS W/OUT COMPLICATIONS: ICD-10-CM

## 2023-07-05 PROBLEM — Z00.00 ENCOUNTER FOR PREVENTIVE HEALTH EXAMINATION: Status: ACTIVE | Noted: 2023-07-05

## 2023-07-05 LAB
CULTURE RESULTS: SIGNIFICANT CHANGE UP
SPECIMEN SOURCE: SIGNIFICANT CHANGE UP

## 2023-07-05 PROCEDURE — 99215 OFFICE O/P EST HI 40 MIN: CPT

## 2023-07-09 NOTE — REASON FOR VISIT
[Referred By: _________] : Patient was referred by VERONA [Spouse] : spouse [New Patient Visit] : a new patient visit

## 2023-07-09 NOTE — ADDENDUM
[FreeTextEntry1] : MRI and CT c-spine and reviewed. Concern for inflammatory spondylopathy. ESR/CRP inflammatory markers are elevated. WBC is low reducing suspicion for infection. No surgical intervention warranted at this time. Patient has been getting some relief with wearing a hard cervical collar. Recommend consultation with rheumatology. Physical therapy. Recommendations discussed with referring pain provider DeGregoris.

## 2023-07-09 NOTE — HISTORY OF PRESENT ILLNESS
[< 3 months] : less than 3 months [FreeTextEntry1] : Neck pain/stiffness [de-identified] : Vu is a 62 year old male who presents to the office accompanied by his wife. \par H/P and timeline of events obtained from pt and his wife.\par On May 26th 2023 pt began developing neck/shoulder pain and stiffness with a gradual onset between 24-48 hours. Pt denies any accident/event precluding the onset of pain. He presented on May 28th to [facility] as his pain had become unmanageable at home with OTC medications. While in the ER CT/MRI of cervical spine were taken and pt was d/c home after r/o meningitis. Pt returned to ER ([facility]) as he was, again, unable to manage his pain. He was given medication for pain and nausea, and referred to Neurologist (Dr. Caballero). During initial visit with Neurologist, pt was prescribed physical therapy and oxycodone, both of which had minimal effect on pts pain. He was then referred to Dr. Rea for pain management, where he was given a steroid epidural (C7 and T1) on 6/20/23 which, per pt, had marked effect on pain and pt stated he felt "so good I even went and played tennis!". The effect of injection lasted 2-3 days before subsiding, and pts neck/shoulder pain and stiffness returned to the previous severity Pt was referred to Dr. Triplett by Dr. Rea for evaluation of symptoms and review of imaging. 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 Pt was diagnosed with CA in 2004 and also underwent chemo and radiation. \par \par Pt states that his pain/shoulder pain ranges from moderate to severe and is constant. Equal level of pain bilaterally, with no radiation/radiculopathy. to or arms. No numbness/tingling. Pt endorses extremely limited range of motion and is unable to turn his head left/right due to pain. He is able to slightly move his head up/down but this range of motion is also severely limited. Pts wife stated he had a low grade fever 2 days before the onset of pain/stiffness, but otherwise denies fever/chills. No vision changes or hearing loss noted. No bladder or bowel symptoms. Pt does endorse lack of appetite and weight loss r/t pain and limited ROM. \par \par Upon assessment, pts posture appears extremely rigid and stiff. Pts posterior neck/cervical spine is sensitive and tender to palpation. \par \par MRI Head/Neck w/o contrast and CT head/neck taken at [facility] on 5/28/23 reviewed by Dr. Triplett and is unremarkable for any abnormalities that would cause pts type or severity of pain/immobility. \par \par Pt was advised to go immediately to [facility] ER for MRI with Contrast of head/neck for evaluation.

## 2023-07-09 NOTE — DATA REVIEWED
[de-identified] : ACC: [id number] EXAM:  CT CERVICAL SPINE   ORDERED BY: SAMANTHA HELLERMAN \par \par PROCEDURE DATE:  06/30/2023  \par \par \par \par INTERPRETATION:  PROCEDURE: CT cervical spine without contrast\par \par INDICATION: Severe neck pain\par \par TECHNIQUE: Multiple axial sections were obtained from the mid orbits to \par the sternoclavicular joint. Sagittal and coronal reformats were obtained \par from the axial data set. The images were reviewed in soft tissue and bone \par windows.\par \par COMPARISON: None\par \par FINDINGS: The CT exam demonstrates loss of the normal cervical lordosis \par which may be secondary to positioning. The vertebral body heights are \par maintained. There is multilevel mild disc space narrowing. Multiple \par prominent anterior osteophytes are noted.. There is a stent that appears \par to be within the right common carotid artery and proximal right internal \par carotid artery. There are surgical clips within the right neck.. There \par are lucent lesions involving the tip of the dens (series 7 image 18) and \par the right lateral mass of C1 (series 6 image 51).\par \par At the C2/3 level, there is a central disc protrusion without significant \par spinal canal stenosis or neural foraminal narrowing.\par \par At the C3/4 level, there is a disc bulge asymmetric to the left with mild \par to moderate spinal canal stenosis. There is uncovertebral and facet \par hypertrophy with severe left and mild to moderate right neural foraminal \par narrowing.\par \par At the C4/5 level, there is a right paracentral disc herniation resulting \par in mild to moderate spinal canal stenosis. There is uncovertebral and \par facet hypertrophy with moderate right and mild to moderate left neural \par foraminal narrowing.\par \par At the C5/6 level, there is posterior osseous ridging with a superimposed \par disc herniation resulting in mild spinal canal stenosis. There is \par uncovertebral and facet hypertrophy with mild right neural foraminal \par narrowing.\par \par At the C6/7 level, a vertebral spurring with mild bilateral neural \par foraminal narrowing without significant spinal canal stenosis.\par \par At the C7/T1 level, there is facet hypertrophy resulting in mild left \par neural foraminal narrowing..\par \par IMPRESSION:\par \par No acute fracture or malalignment. Lucent lesions in the right lateral \par mass of C1 and the tip of the dens is uncertain etiology. Recommend \par further evaluation with dedicated MRI cervical spine with and without IV \par contrast.\par \par Multilevel cervical spondylosis most prominent at C3-4 with severe left \par neural foraminal narrowing and mild to moderate spinal canal stenosis. \par Additional mild to moderate degenerative changes as above.\par \par --- End of Report ---\par \par \par \par \par \par HALLEY MARCANO MD; Attending Radiologist\par This document has been electronically signed. Jun 30 2023  2:35PM [de-identified] : ACC: [id number] EXAM:  MR SPINE CERVICAL WAW IC   ORDERED BY: HAIR \laquita HELLERMAN \par \par PROCEDURE DATE:  06/30/2023  \par \par \par \par INTERPRETATION:  PROCEDURE: MRI cervical spine with and without contrast\par \par INDICATION: Severe neck pain; rule out cord compression\par \par TECHNIQUE: Sagittal T1, T2, STIR and axial T2 and gradient echo images of \par the cervical spine were obtained. Following the intravenous \par administration of 8 ml of Gadavist, sagittal and axial T1-weighted images \par were obtained. Some of the images are degraded by motion artifact.\par \par COMPARISON: CT cervical spine 6/30/2023 at 2:10 PM\par \par FINDINGS: Please note the axial images do not cover the C1-C2 level.\par \par The MR examination demonstrates irregularity of the dens with surrounding \par soft tissue which may represent pannus on the cervicomedullary junction. \par There is STIR signal abnormality within the dens with diffuse \par enhancement. No fluid within the anterior atlantodens interval is noted \par is there widening. There is additional STIR/T2 signal abnormality \par involving the right occipital condyle and lateral mass of C1 (series 8 \par image 3) with diffuse enhancement which may extend to the adjacent soft \par tissues (series 8 image 1). The joint space appears narrowed as best seen \par on the coronal images from the CT. These findings may be secondary to \par underlying inflammatory arthropathy. Alternative considerations would \par include infection and less likely tumoral disease. Correlation with ESR, \par CRP, and WBC is recommended.\par \par There is approximately 2 mm of anterolisthesis of C7 on T1 and T1 on T2. \par There is mild loss of intervertebral disc space height at C4/5 and C5/6. \par The vertebral body heights and rest of the intervertebral disc spaces are \par maintained. The cervical and upper thoracic marrow signal as well as the \par clivus appears fatty replaced. Clinical correlation for radiation is \par recommended. No abnormal signal is identified within the cervical cord. \par The cerebellar tonsils are normal in position.\par \par At the C2/3 level, there is small central disc herniation. There are \par degenerative changes involving the left facet. There is no central spinal \par canal stenosis. There is mild left neural foramen narrowing.\par \par At the C3/4 level, there is osseous ridging with moderate-sized central \par disc herniation indenting the thecal sac. There is bilateral \par uncovertebral joint and degenerative facet disease (left greater than \par right) as well as ligamentum flavum hypertrophy. This combination results \par in mild cord compression with moderate central spinal canal stenosis \par There is moderate right and severe left neural foramen narrowing.\par \par At the C4/5 level, there is a large central disc herniation indenting the \par thecal sac. There is bilateral uncovertebral joint and degenerative facet \par disease as well as ligamentum flavum hypertrophy. This combination \par results in mild central spinal canal stenosis. There is severe right and \par moderate left neural foramen narrowing.\par \par At the C5/6 level, there is a moderate size central disc herniation \par indenting the thecal sac. There is bilateral uncovertebral joint and \par degenerative facet disease. This combination results in mild central \par spinal canal stenosis. There is mild bilateral neural foramen narrowing.\par \par At the C6/7 level, there is a small central disc herniation. There is \par bilateral uncovertebral joint and degenerative facet disease. There is no \par central spinal canal stenosis. There is mild bilateral neural foramen \par narrowing.\par \par At the C7/T1 level, there is uncovering of the intervertebral disc space \par with disc bulge. There is bilateral uncovertebral joint and degenerative \par facet disease. There is no central spinal canal stenosis. There is mild \par to moderate bilateral neural foramen narrowing.\par \par IMPRESSION: Findings suggestive of inflammatory arthropathy involving C1 \par and C2 joint space and right atlantooccipital joint and clinical \par correlation is recommended. Multilevel degenerative disc disease with \par spinal stenosis with mild cord compression at C3/4 as detailed above.\par \par --- End of Report ---\par \par \par \par \par \par MARIELY RIZO MD; Attending Radiologist\par This document has been electronically signed. Jun 30 2023  5:59PM [de-identified] : ACC: [id number] EXAM:  XR C SPINE AP LAT ONLY 2-3V   ORDERED BY: HAIR \par HELLERMAN \par \par PROCEDURE DATE:  06/30/2023  \par \par \par \par INTERPRETATION:  PROCEDURE: AP and lateral views (2) of the cervical spine\par \par INDICATION: Neck pain\par \par COMPARISON: None\par \par CORRELATION: CT cervical spine 6/30/2023\par \par FINDINGS: There is approximately 3 mm of grade 1 anterolisthesis of C7 on \par T1. There is mild loss of intervertebral disc space height at C4/5 and \par C5/6. The vertebral body heights and rest of the intervertebral disc \par spaces are maintained. There is anterior osteophytic lipping at the C4/5 \par through the C7/T1 levels. The prevertebral soft tissues are within normal \par limits. There is no fracture.\par \par Multiple clips are identified within the right neck and right floor of \par the mouth. A vascular stent is present within the right neck. Correlation \par with surgical history is recommended.\par \par IMPRESSION: Grade 1 anterolisthesis of C7 on T1. Cervical spondylosis.\par \par --- End of Report ---\par \par \par \par \par \par MARIELY RIZO MD; Attending Radiologist\par This document has been electronically signed. Jun 30 2023  6:24PM

## 2023-07-09 NOTE — ASSESSMENT
[FreeTextEntry1] : IMAGING from  reviewed by Dr. Triplett with patient and pts wife unremarkable for abnormalities related to pts symptoms.\par \par Pt advised to go immediately to [facility] ER for evaluation and MRI Head/Neck WITH Contrast. \par Office/Dr. Triplett to follow-up with pt once imaging is received and reviewed. \par \par Patient and patient's family verbalize agreement and understanding with plan of care.\par \par I, Dr. Triplett, personally performed the evaluation and management (E/M) services for this established patient who presents today with (a) new problem(s)/exacerbation of (an) existing condition(s).  That E/M includes conducting the examination, assessing all new/exacerbated conditions, and establishing a new plan of care.  Today, my RN, Lisa Foote, was here to observe my evaluation and management services for this new problem/exacerbated condition to be followed going forward.\par

## 2023-07-09 NOTE — HISTORY OF PRESENT ILLNESS
[< 3 months] : less than 3 months [FreeTextEntry1] : Neck pain/stiffness [de-identified] : Vu is a 62 year old male who presents to the office accompanied by his wife. \par H/P and timeline of events obtained from pt and his wife.\par On May 26th 2023 pt began developing neck/shoulder pain and stiffness with a gradual onset between 24-48 hours. Pt denies any accident/event precluding the onset of pain. He presented on May 28th to [facility] as his pain had become unmanageable at home with OTC medications. While in the ER CT/MRI of cervical spine were taken and pt was d/c home after r/o meningitis. Pt returned to ER ([facility] [place]) as he was, again, unable to manage his pain. He was given medication for pain and nausea, and referred to Neurologist (Dr. Caballero). During initial visit with Neurologist, pt was prescribed physical therapy and oxycodone, both of which had minimal effect on pts pain. He was then referred to Dr. Rea for pain management, where he was given a steroid epidural (C7 and T1) on 6/20/23 which, per pt, had marked effect on pain and pt stated he felt "so good I even went and played tennis!". The effect of injection lasted 2-3 days before subsiding, and pts neck/shoulder pain and stiffness returned to the previous severity Pt was referred to Dr. Triplett by Dr. Rea for evaluation of symptoms and review of imaging. 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 Pt was diagnosed with CA in 2004 and also underwent chemo and radiation. \par \par 6/30/23\par Pt states that his pain/shoulder pain ranges from moderate to severe and is constant. Equal level of pain bilaterally, with no radiation/radiculopathy. to or arms. No numbness/tingling. Pt endorses extremely limited range of motion and is unable to turn his head left/right due to pain. He is able to slightly move his head up/down but this range of motion is also severely limited. Pts wife stated he had a low grade fever 2 days before the onset of pain/stiffness, but otherwise denies fever/chills. No vision changes or hearing loss noted. No bladder or bowel symptoms. Pt does endorse lack of appetite and weight loss r/t pain and limited ROM. \par \par Upon assessment, pts posture appears extremely rigid and stiff. Pts posterior neck/cervical spine is sensitive and tender to palpation. \par \par MRI Head/Neck w/o contrast and CT head/neck taken at [facility] on 5/28/23 reviewed by Dr. Triplett and is unremarkable for any abnormalities that would cause pts type or severity of pain/immobility. \par \par Pt was advised to go immediately to [facility] ER for MRI with Contrast of head/neck for evaluation.

## 2023-07-09 NOTE — ADDENDUM
[FreeTextEntry1] : Patient unable to move neck. He has exquisite tenderness to posterior cervical palpation. MRI without contrast showing cervical stenosis but his exam is out of proportion to imaging findings. I am concerned for possible infectious etiology or fracture given the tenderness/severe pain and lack of neck mobility. Recommend patient proceed to ED for MRI C-spine with contrast and CT scan of C-spine.

## 2023-07-09 NOTE — REASON FOR VISIT
[Follow-Up: _____] : a [unfilled] follow-up visit [FreeTextEntry1] : He returns for ED follow up, Recommended CT cervical spine, MRI cervical spine with and without IV contrast and AP/lateral cervical spine xrays which showed C1-2 inflammatory arthropathy and C3-4 mild cord compression without signal changes. Inflammatory marker showed elevations- ESR (51), CRP (33.3) without systemic infection/ blood cultures (no growth om day 4).\par Neuro sx consult in ED recommended to continue to wear cervical hard collar and follow up today for outpatient evaluation.

## 2023-07-11 ENCOUNTER — APPOINTMENT (OUTPATIENT)
Dept: RHEUMATOLOGY | Facility: CLINIC | Age: 63
End: 2023-07-11

## 2023-07-12 ENCOUNTER — OUTPATIENT (OUTPATIENT)
Dept: OUTPATIENT SERVICES | Facility: HOSPITAL | Age: 63
LOS: 1 days | End: 2023-07-12
Payer: COMMERCIAL

## 2023-07-12 ENCOUNTER — APPOINTMENT (OUTPATIENT)
Dept: RHEUMATOLOGY | Facility: CLINIC | Age: 63
End: 2023-07-12
Payer: COMMERCIAL

## 2023-07-12 ENCOUNTER — RESULT REVIEW (OUTPATIENT)
Age: 63
End: 2023-07-12

## 2023-07-12 VITALS
SYSTOLIC BLOOD PRESSURE: 120 MMHG | HEART RATE: 60 BPM | HEIGHT: 70 IN | OXYGEN SATURATION: 93 % | BODY MASS INDEX: 25.05 KG/M2 | DIASTOLIC BLOOD PRESSURE: 70 MMHG | WEIGHT: 175 LBS

## 2023-07-12 DIAGNOSIS — Z85.810 PERSONAL HISTORY OF MALIGNANT NEOPLASM OF TONGUE: Chronic | ICD-10-CM

## 2023-07-12 DIAGNOSIS — M54.6 PAIN IN THORACIC SPINE: ICD-10-CM

## 2023-07-12 DIAGNOSIS — Z87.891 PERSONAL HISTORY OF NICOTINE DEPENDENCE: ICD-10-CM

## 2023-07-12 DIAGNOSIS — E55.9 VITAMIN D DEFICIENCY, UNSPECIFIED: ICD-10-CM

## 2023-07-12 DIAGNOSIS — M54.50 LOW BACK PAIN, UNSPECIFIED: ICD-10-CM

## 2023-07-12 DIAGNOSIS — Z78.9 OTHER SPECIFIED HEALTH STATUS: ICD-10-CM

## 2023-07-12 DIAGNOSIS — M54.9 DORSALGIA, UNSPECIFIED: ICD-10-CM

## 2023-07-12 DIAGNOSIS — Z90.09 ACQUIRED ABSENCE OF OTHER PART OF HEAD AND NECK: Chronic | ICD-10-CM

## 2023-07-12 PROCEDURE — 99204 OFFICE O/P NEW MOD 45 MIN: CPT

## 2023-07-12 PROCEDURE — 72070 X-RAY EXAM THORAC SPINE 2VWS: CPT | Mod: 26

## 2023-07-12 PROCEDURE — 72100 X-RAY EXAM L-S SPINE 2/3 VWS: CPT

## 2023-07-12 PROCEDURE — 72202 X-RAY EXAM SI JOINTS 3/> VWS: CPT | Mod: 26

## 2023-07-12 PROCEDURE — 72070 X-RAY EXAM THORAC SPINE 2VWS: CPT

## 2023-07-12 PROCEDURE — G0452: CPT | Mod: 26

## 2023-07-12 PROCEDURE — 72202 X-RAY EXAM SI JOINTS 3/> VWS: CPT

## 2023-07-12 PROCEDURE — 72100 X-RAY EXAM L-S SPINE 2/3 VWS: CPT | Mod: 26

## 2023-07-12 NOTE — ASSESSMENT
[FreeTextEntry1] : Findings concerning for inflammatory arthritis on c-spine MRI: Pt's symptoms are focal to his neck, and he has no other either musculoskeletal or systemic findings to suggest an underlying inflammatory arthritis as the cause of his neck pain. Pannus formation in the c-spine can be seen with rheumatoid arthritis as well as calcium pyrophosphate deposition disease; would also evaluate pt for seronegative spondyloarthropathy as this can also be a/w inflammatory arthritis affecting different levels of the spine, however pt does not have any other symptoms or exam findings to suggest this process. He also has no other symptoms to suggest an underlying infectious process; reportedly was told by his oncologist that there is low suspicion that recurrence of his cancer is a/w his symptoms.\par - f/u labs for RA, also HLA B27 and labs for CPPD\par - f/u x-rays of t-spine, l-spine and SI joints\par - Advised pt that he can take ibuprofen in addition to oxycodone for pain, there is no contraindication to taking them together\par - Offered pt a trial of PO prednisone for his symptoms but he declined as he is concerned about side effects, said he would prefer not to take it unless he has no other options. Can also consider possible trial of hydroxychloroquine or colchicine for CPPD if pt's findings are inconclusive?\par \par f/u prn, will call pt with lab and imaging results

## 2023-07-12 NOTE — REASON FOR VISIT
[Initial Evaluation] : an initial evaluation [FreeTextEntry1] : Severe neck pain x 2 months with findings concerning for possible inflammatory arthritis on c-spine MRI

## 2023-07-12 NOTE — HISTORY OF PRESENT ILLNESS
[FreeTextEntry1] : Pt has a h/o neck and thyroid cancer 10 years ago, s/p extensive surgery. He says he last saw his oncologist and had a PET scan 4 years ago, and was told that he doesn't need to follow up any longer because everything looked good. He did not have any issues with his neck until May 27th, when he woke up with burning pain on the back of his neck, and over the course of the day the pain severely worsened and became unbearable. Since that time, pt has had waxing and waning symptoms with significantly restricted ROM in his neck due to pain. 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 He has been taking prn oxycodone with some pain relief; also tried ibuprofen, which helped but his PMD advised him not to take it together with the oxycodone?\par \par Pt went to multiple EDs and saw neurosurgery and pain management. He had an epidural with pain relief for 3 days. He had an MRI c-spine with and without contrast at [facility] which demonstrated findings concerning for pannus formation at the cervicomedullary junction, STIR signal abnormality in the dens with diffuse enhancement, and additional abnormal signal within the R occipital condyle and lateral mass of C1, extending into the soft tissues, concerning for possible inflammatory arthritis. Pt also had labs which demonstrated ESR 51, CRP 33, and glucose of 288. \par \par Pt says he spoke with his oncologist and was told that these findings are not concerning for recurrence of his cancer.\par \par Pt denies any swelling, pain or stiffness in any joints or any other parts of his back. He has been feeling well otherwise, denies eye problems, diarrhea, skin problems, fevers, numbness or tingling. No known family h/o autoimmune disease.\par \par Physical exam: GEN: Pleasant, AAO man sitting on exam table in NAD\par SKIN: no rashes\par ENT: + Muscle wasting and scarring noted extensively on anterior neck. Normal tympanic reflex b/l\par PULM: Clear to auscultation b/l\par CV: Regular rate and rhythm, no murmurs\par MSK:\par Neck: Significantly limited (<10 degrees) flexion, extension, and rotation in all directions\par Shoulders: Full ROM b/l\par Elbows: Full ROM b/l, no effusions\par Wrists: Full ROM b/l, no effusions\par Hands: no synovitis\par Hips: Full ROM b/l\par Knees: no effusions, full ROM b/l\par Ankles: no effusions, full ROM b/l\par Feet: no effusions, no TTP\par EXT: No psoriatic nail changes in fingernails. Dystrophic toenails\par

## 2023-07-13 DIAGNOSIS — M54.6 PAIN IN THORACIC SPINE: ICD-10-CM

## 2023-07-13 DIAGNOSIS — M54.50 LOW BACK PAIN, UNSPECIFIED: ICD-10-CM

## 2023-07-17 RX ORDER — HYDROXYCHLOROQUINE SULFATE 200 MG/1
200 TABLET, FILM COATED ORAL DAILY
Qty: 90 | Refills: 0 | Status: DISCONTINUED | COMMUNITY
Start: 2023-07-17 | End: 2023-07-17

## 2023-07-18 LAB
25(OH)D3 SERPL-MCNC: 29 NG/ML
ALBUMIN SERPL ELPH-MCNC: 4.1 G/DL
ALP BLD-CCNC: 92 U/L
ALT SERPL-CCNC: 15 U/L
ANION GAP SERPL CALC-SCNC: 13 MMOL/L
AST SERPL-CCNC: 10 U/L
BILIRUB SERPL-MCNC: 0.3 MG/DL
BUN SERPL-MCNC: 12 MG/DL
CALCIUM SERPL-MCNC: 10 MG/DL
CCP AB SER IA-ACNC: 11 UNITS
CHLORIDE SERPL-SCNC: 96 MMOL/L
CO2 SERPL-SCNC: 27 MMOL/L
CREAT SERPL-MCNC: 0.9 MG/DL
CRP SERPL-MCNC: 33.9 MG/L
EGFR: 98 ML/MIN/1.73M2
ENA SS-A AB SER IA-ACNC: 0.2 AL
ENA SS-B AB SER IA-ACNC: <0.2 AL
FERRITIN SERPL-MCNC: 691 NG/ML
GLUCOSE SERPL-MCNC: 188 MG/DL
HLA-B27 QL NAA+PROBE: NORMAL
IRON SATN MFR SERPL: 17 %
IRON SERPL-MCNC: 40 UG/DL
MAGNESIUM SERPL-MCNC: 2.2 MG/DL
PHOSPHATE SERPL-MCNC: 3.6 MG/DL
POTASSIUM SERPL-SCNC: 4.4 MMOL/L
PROT SERPL-MCNC: 7 G/DL
RF+CCP IGG SER-IMP: NEGATIVE
RHEUMATOID FACT SER QL: <10 IU/ML
SODIUM SERPL-SCNC: 136 MMOL/L
TIBC SERPL-MCNC: 229 UG/DL
UIBC SERPL-MCNC: 189 UG/DL

## 2023-07-18 RX ORDER — CELECOXIB 200 MG/1
200 CAPSULE ORAL DAILY
Qty: 90 | Refills: 0 | Status: ACTIVE | COMMUNITY
Start: 2023-07-18 | End: 1900-01-01

## 2023-07-18 RX ORDER — CELECOXIB 200 MG/1
200 CAPSULE ORAL DAILY
Qty: 90 | Refills: 0 | Status: DISCONTINUED | COMMUNITY
Start: 2023-07-17 | End: 2023-07-18

## 2023-07-18 RX ORDER — HYDROXYCHLOROQUINE SULFATE 200 MG/1
200 TABLET, FILM COATED ORAL
Qty: 180 | Refills: 0 | Status: DISCONTINUED | COMMUNITY
Start: 2023-07-17 | End: 2023-07-18

## 2023-07-20 LAB — 14-3-3 ETA AG SER IA-MCNC: <0.2 NG/ML

## 2023-12-05 ENCOUNTER — APPOINTMENT (OUTPATIENT)
Dept: RHEUMATOLOGY | Facility: CLINIC | Age: 63
End: 2023-12-05
Payer: COMMERCIAL

## 2023-12-05 VITALS
HEART RATE: 64 BPM | BODY MASS INDEX: 25.05 KG/M2 | TEMPERATURE: 98 F | OXYGEN SATURATION: 92 % | WEIGHT: 175 LBS | DIASTOLIC BLOOD PRESSURE: 88 MMHG | HEIGHT: 70 IN | SYSTOLIC BLOOD PRESSURE: 159 MMHG

## 2023-12-05 PROCEDURE — 99214 OFFICE O/P EST MOD 30 MIN: CPT

## 2023-12-06 ENCOUNTER — NON-APPOINTMENT (OUTPATIENT)
Age: 63
End: 2023-12-06

## 2023-12-18 ENCOUNTER — APPOINTMENT (OUTPATIENT)
Dept: NEUROSURGERY | Facility: CLINIC | Age: 63
End: 2023-12-18
Payer: COMMERCIAL

## 2023-12-18 VITALS
RESPIRATION RATE: 18 BRPM | TEMPERATURE: 97.1 F | HEIGHT: 70 IN | WEIGHT: 175 LBS | HEART RATE: 58 BPM | SYSTOLIC BLOOD PRESSURE: 135 MMHG | OXYGEN SATURATION: 93 % | BODY MASS INDEX: 25.05 KG/M2 | DIASTOLIC BLOOD PRESSURE: 85 MMHG

## 2023-12-18 PROCEDURE — 99214 OFFICE O/P EST MOD 30 MIN: CPT

## 2023-12-19 NOTE — PHYSICAL EXAM
[General Appearance - Alert] : alert [Oriented To Time, Place, And Person] : oriented to person, place, and time [Person] : oriented to person [Place] : oriented to place [Time] : oriented to time [Cranial Nerves Optic (II)] : visual acuity intact bilaterally,  pupils equal round and reactive to light [Cranial Nerves Oculomotor (III)] : extraocular motion intact [Cranial Nerves Trigeminal (V)] : facial sensation intact symmetrically [Cranial Nerves Facial (VII)] : face symmetrical [Cranial Nerves Vestibulocochlear (VIII)] : hearing was intact bilaterally [Motor Tone] : muscle tone was normal in all four extremities [Motor Strength] : muscle strength was normal in all four extremities [Balance] : balance was intact [C-Spine ___ (level)] : ~Ulevel [unfilled] cervical spine [Muscle Spasms, Bilateral] : bilateral muscle spasms [Restricted] : was restricted [Pain] : was painful [PERRL With Normal Accommodation] : pupils were equal in size, round, reactive to light, with normal accommodation [Full Visual Field] : full visual field [] : no respiratory distress [Heart Rate And Rhythm] : heart rate was normal and rhythm regular [Abnormal Walk] : normal gait [Skin Color & Pigmentation] : normal skin color and pigmentation

## 2023-12-19 NOTE — ASSESSMENT
[FreeTextEntry1] : MRI cervical spine done in 11/2023 reviewed by Dr. Triplett with patient and patient's wife, demonstrates abnormal signal in the odontoid with prominence of the adjacent soft tissues, anterior subluxation of C1, and superior subluxation of the odontoid with mass effect upon the cervical medullary junction. Findings have progressed from prior study and metastatic disease cannot be excluded. Findings on the MRI can be related to prior RT treatment, ?radionecrosis recommend CT head without contrast for further evaluation of subluxation of odontoid and C2 recommend MRI cervical spine with and without contrast in 3 months (February/March 2024) to evaluate stability of findings and rule out underlying metastatic disease After CT cervical spine complete, RTO to review imaging with Dr. Triplett  Patient and patient's family verbalize agreement and understanding with plan of care.  I, Dr. Triplett, personally performed the evaluation and management (E/M) services for this established patient who presents today with (a) new problem(s)/exacerbation of (an) existing condition(s).  That E/M includes conducting the examination, assessing all new/exacerbated conditions, and establishing a new plan of care.  Today, my ACP, Karen Sullivan, was here to observe my evaluation and management services for this new problem/exacerbated condition to be followed going forward.

## 2023-12-19 NOTE — HISTORY OF PRESENT ILLNESS
[< 3 months] : less than 3 months [FreeTextEntry1] : Neck pain/stiffness [de-identified] : Vu is a 62 year old male who presents to the office accompanied by his wife.  H/P and timeline of events obtained from pt and his wife. On May 26th 2023 pt began developing neck/shoulder pain and stiffness with a gradual onset between 24-48 hours. Pt denies any accident/event precluding the onset of pain. He presented on May 28th to [facility] as his pain had become unmanageable at home with OTC medications. While in the ER CT/MRI of cervical spine were taken and pt was d/c home after r/o meningitis. Pt returned to ER ([facility]) as he was, again, unable to manage his pain. He was given medication for pain and nausea, and referred to Neurologist (Dr. Caballero). During initial visit with Neurologist, pt was prescribed physical therapy and oxycodone, both of which had minimal effect on pts pain. He was then referred to Dr. Rea for pain management, where he was given a steroid epidural (C7 and T1) on 6/20/23 which, per pt, had marked effect on pain and pt stated he felt "so good I even went and played tennis!". The effect of injection lasted 2-3 days before subsiding, and pts neck/shoulder pain and stiffness returned to the previous severity Pt was referred to Dr. Triplett by Dr. Rea for evaluation of symptoms and review of imaging.   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 Pt was diagnosed with CA in 2004 and also underwent chemo and radiation.   6/30/23 Pt states that his pain/shoulder pain ranges from moderate to severe and is constant. Equal level of pain bilaterally, with no radiation/radiculopathy. to or arms. No numbness/tingling. Pt endorses extremely limited range of motion and is unable to turn his head left/right due to pain. He is able to slightly move his head up/down but this range of motion is also severely limited. Pts wife stated he had a low grade fever 2 days before the onset of pain/stiffness, but otherwise denies fever/chills. No vision changes or hearing loss noted. No bladder or bowel symptoms. Pt does endorse lack of appetite and weight loss r/t pain and limited ROM.   Upon assessment, pts posture appears extremely rigid and stiff. Pts posterior neck/cervical spine is sensitive and tender to palpation.   MRI Head/Neck w/o contrast and CT head/neck taken at [facility] on 5/28/23 reviewed by Dr. Triplett and is unremarkable for any abnormalities that would cause pts type or severity of pain/immobility.   At initial visit, he was advised to go immediately to [facility] ER for MRI with Contrast of head/neck for evaluation.  At follow up visit on 7/5/23, he returned post ED follow up.  CT cervical spine, MRI cervical spine with and without IV contrast and AP/lateral cervical spine xrays which showed C1-2 inflammatory arthropathy and C3-4 mild cord compression without signal changes. Inflammatory marker showed elevations- ESR (51), CRP (33.3) without systemic infection/ blood cultures (no growth om day 4). Neuro sx consult in ED recommended to continue to wear cervical hard collar and follow up today for outpatient evaluation. HE was not recommended surgical intervention. Concern for inflammatory spondylopathy. ESR/CRP inflammatory markers elevated. WBS is low reducing suspicion for infection. He was referred to rheumatology.  His rheumatologist is Dr. Elizalde. He was on steroids for inflammatory arthritis but they have been discontinued due to labile blood sugar. He had repeat MRI cervical spine and returns today for review. He is accompanied by his wife. He reports improvement in mobility of neck. Also reports improvement in neck pain.  Returns to the office today to review recent MRI cervical spine, which demonstrates abnormal signal in the odontoid with prominence of the adjacent soft tissues, anterior subluxation of C1, and superior subluxation of the odontoid with mass effect upon the cervical medullary junction. Findings have progressed from prior study and metastatic disease cannot be excluded.

## 2023-12-29 ENCOUNTER — APPOINTMENT (OUTPATIENT)
Dept: CT IMAGING | Facility: CLINIC | Age: 63
End: 2023-12-29
Payer: COMMERCIAL

## 2023-12-29 ENCOUNTER — OUTPATIENT (OUTPATIENT)
Dept: OUTPATIENT SERVICES | Facility: HOSPITAL | Age: 63
LOS: 1 days | End: 2023-12-29

## 2023-12-29 DIAGNOSIS — Z90.09 ACQUIRED ABSENCE OF OTHER PART OF HEAD AND NECK: Chronic | ICD-10-CM

## 2023-12-29 DIAGNOSIS — Z85.810 PERSONAL HISTORY OF MALIGNANT NEOPLASM OF TONGUE: Chronic | ICD-10-CM

## 2023-12-29 PROBLEM — C02.9 MALIGNANT NEOPLASM OF TONGUE, UNSPECIFIED: Chronic | Status: ACTIVE | Noted: 2023-06-30

## 2023-12-29 PROBLEM — E11.9 TYPE 2 DIABETES MELLITUS WITHOUT COMPLICATIONS: Chronic | Status: ACTIVE | Noted: 2023-06-30

## 2023-12-29 PROCEDURE — 72125 CT NECK SPINE W/O DYE: CPT | Mod: 26

## 2024-01-03 ENCOUNTER — APPOINTMENT (OUTPATIENT)
Dept: RHEUMATOLOGY | Facility: CLINIC | Age: 64
End: 2024-01-03

## 2024-01-04 ENCOUNTER — NON-APPOINTMENT (OUTPATIENT)
Age: 64
End: 2024-01-04

## 2024-01-23 ENCOUNTER — APPOINTMENT (OUTPATIENT)
Dept: NEUROSURGERY | Facility: CLINIC | Age: 64
End: 2024-01-23

## 2024-02-26 ENCOUNTER — APPOINTMENT (OUTPATIENT)
Dept: NEUROSURGERY | Facility: CLINIC | Age: 64
End: 2024-02-26
Payer: MEDICAID

## 2024-02-26 VITALS
HEIGHT: 70 IN | RESPIRATION RATE: 18 BRPM | TEMPERATURE: 97.4 F | HEART RATE: 55 BPM | SYSTOLIC BLOOD PRESSURE: 137 MMHG | OXYGEN SATURATION: 98 % | WEIGHT: 190 LBS | DIASTOLIC BLOOD PRESSURE: 76 MMHG | BODY MASS INDEX: 27.2 KG/M2

## 2024-02-26 PROCEDURE — 99214 OFFICE O/P EST MOD 30 MIN: CPT

## 2024-02-27 NOTE — ASSESSMENT
[FreeTextEntry1] : CT cervical spine without contrast done on 12/29/23 reviewed by Dr. Triplett with patient Recommend annual CT cervical spine without contrast (December 2024) Also recommend consultation with rheumatology for inflammatory arthritis - referral provided After CT cervical spine complete, RTO to review imaging with Dr. Triplett  Patient and patient's family verbalize agreement and understanding with plan of care.  I, Dr. Triplett, personally performed the evaluation and management (E/M) services for this established patient who presents today with (a) new problem(s)/exacerbation of (an) existing condition(s).  That E/M includes conducting the examination, assessing all new/exacerbated conditions, and establishing a new plan of care.  Today, my ACP, Karen Sullivan, was here to observe my evaluation and management services for this new problem/exacerbated condition to be followed going forward.

## 2024-02-27 NOTE — HISTORY OF PRESENT ILLNESS
[< 3 months] : less than 3 months [de-identified] : Vu is a 62 year old male who presents to the office accompanied by his wife.  H/P and timeline of events obtained from pt and his wife. On May 26th 2023 pt began developing neck/shoulder pain and stiffness with a gradual onset between 24-48 hours. Pt denies any accident/event precluding the onset of pain. He presented on May 28th to [facility] as his pain had become unmanageable at home with OTC medications. While in the ER CT/MRI of cervical spine were taken and pt was d/c home after r/o meningitis. Pt returned to ER ([facility]) as he was, again, unable to manage his pain. He was given medication for pain and nausea, and referred to Neurologist (Dr. Caballero). During initial visit with Neurologist, pt was prescribed physical therapy and oxycodone, both of which had minimal effect on pts pain. He was then referred to Dr. Rea for pain management, where he was given a steroid epidural (C7 and T1) on 6/20/23 which, per pt, had marked effect on pain and pt stated he felt "so good I even went and played tennis!". The effect of injection lasted 2-3 days before subsiding, and pts neck/shoulder pain and stiffness returned to the previous severity Pt was referred to Dr. Triplett by Dr. Rea for evaluation of symptoms and review of imaging.   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 Pt was diagnosed with CA in 2004 and also underwent chemo and radiation.   6/30/23 Pt states that his pain/shoulder pain ranges from moderate to severe and is constant. Equal level of pain bilaterally, with no radiation/radiculopathy. to or arms. No numbness/tingling. Pt endorses extremely limited range of motion and is unable to turn his head left/right due to pain. He is able to slightly move his head up/down but this range of motion is also severely limited. Pts wife stated he had a low grade fever 2 days before the onset of pain/stiffness, but otherwise denies fever/chills. No vision changes or hearing loss noted. No bladder or bowel symptoms. Pt does endorse lack of appetite and weight loss r/t pain and limited ROM.   Upon assessment, pts posture appears extremely rigid and stiff. Pts posterior neck/cervical spine is sensitive and tender to palpation.   MRI Head/Neck w/o contrast and CT head/neck taken at [facility] on 5/28/23 reviewed by Dr. Triplett and is unremarkable for any abnormalities that would cause pts type or severity of pain/immobility.   At initial visit, he was advised to go immediately to [facility] ER for MRI with Contrast of head/neck for evaluation.  At follow up visit on 7/5/23, he returned post ED follow up.  CT cervical spine, MRI cervical spine with and without IV contrast and AP/lateral cervical spine xrays which showed C1-2 inflammatory arthropathy and C3-4 mild cord compression without signal changes. Inflammatory marker showed elevations- ESR (51), CRP (33.3) without systemic infection/ blood cultures (no growth om day 4). Neuro sx consult in ED recommended to continue to wear cervical hard collar and follow up today for outpatient evaluation. HE was not recommended surgical intervention. Concern for inflammatory spondylopathy. ESR/CRP inflammatory markers elevated. WBS is low reducing suspicion for infection. He was referred to rheumatology.  His rheumatologist is Dr. Elizalde. He was on steroids for inflammatory arthritis but they have been discontinued due to labile blood sugar. He had repeat MRI cervical spine and returns today for review. He is accompanied by his wife. He reports improvement in mobility of neck. Also reports improvement in neck pain.  12/18/23: Returns to the office today to review recent MRI cervical spine, which demonstrates abnormal signal in the odontoid with prominence of the adjacent soft tissues, anterior subluxation of C1, and superior subluxation of the odontoid with mass effect upon the cervical medullary junction. Findings have progressed from prior study and metastatic disease cannot be excluded.  2/26/24: Returns today to review CT cervical spine from 12/29/23. He was recently hospitalized for pneumonia and was on steroids for pneumonia. Reported improvement in neck pain while taking steroids. Once stopped, neck pain returned.  [FreeTextEntry1] : Neck pain/stiffness

## 2024-02-27 NOTE — PHYSICAL EXAM
[General Appearance - Alert] : alert [Oriented To Time, Place, And Person] : oriented to person, place, and time [Person] : oriented to person [Place] : oriented to place [Time] : oriented to time [Cranial Nerves Optic (II)] : visual acuity intact bilaterally,  pupils equal round and reactive to light [Cranial Nerves Oculomotor (III)] : extraocular motion intact [Cranial Nerves Vestibulocochlear (VIII)] : hearing was intact bilaterally [Cranial Nerves Facial (VII)] : face symmetrical [Cranial Nerves Trigeminal (V)] : facial sensation intact symmetrically [Motor Tone] : muscle tone was normal in all four extremities [Motor Strength] : muscle strength was normal in all four extremities [Balance] : balance was intact [C-Spine ___ (level)] : ~Ulevel [unfilled] cervical spine [Muscle Spasms, Bilateral] : bilateral muscle spasms [Restricted] : was restricted [Pain] : was painful [Full Visual Field] : full visual field [PERRL With Normal Accommodation] : pupils were equal in size, round, reactive to light, with normal accommodation [] : no respiratory distress [Heart Rate And Rhythm] : heart rate was normal and rhythm regular [Abnormal Walk] : normal gait [Skin Color & Pigmentation] : normal skin color and pigmentation

## 2024-02-27 NOTE — DATA REVIEWED
[de-identified] : EXAM: [id number] - CT CERVICAL SPINE  - ORDERED BY: NAKIA ROSARIO   PROCEDURE DATE:  12/29/2023    INTERPRETATION:  PROCEDURE: CT cervical spine without contrast  INDICATION: Neck pain history of prior radiation therapy  TECHNIQUE: Multiple axial sections were obtained from the mid orbits to the sternoclavicular joint. Sagittal and coronal reformats were obtained from the axial data set. The images were reviewed in soft tissue and bone windows.  COMPARISON: CT cervical spine 6/30/2023 and MRI cervical spine 6/30/2023  FINDINGS: Since prior exam, there has been interval development of more prominent sclerosis of the tip of the odontoid process. There is decrease extent of linear lucencies along the tip of the odontoid process compared to prior exam. There is increase lucency involving the right lateral mass of C1 (series 3 image 22). The degree of soft tissue density posterior to the dens has improved compared to 6/30/2023 although perhaps slightly more prominent along the right lateral mass of C1.  There is straightening of the normal cervical lordosis. The vertebral body heights are maintained..  There is no significant change in multilevel cervical spondylosis. Postsurgical changes in the right neck with right carotid stent are again noted.  IMPRESSION:  Since prior CT cervical spine 6/20/2023, more prominent sclerosis along the tip of the dens with decreased lucency which may reflect evolving inflammatory/radiation changes given patient's history. More prominent lucency in the right lateral mass of C1 with the differential as above. No acute fracture.  --- End of Report ---       HALLEY MARCANO MD; Attending Radiologist This document has been electronically signed. Jan 4 2024  9:58AM

## 2024-05-09 ENCOUNTER — LABORATORY RESULT (OUTPATIENT)
Age: 64
End: 2024-05-09

## 2024-05-09 ENCOUNTER — APPOINTMENT (OUTPATIENT)
Dept: RHEUMATOLOGY | Facility: CLINIC | Age: 64
End: 2024-05-09
Payer: MEDICAID

## 2024-05-09 VITALS
TEMPERATURE: 98.2 F | HEART RATE: 50 BPM | BODY MASS INDEX: 28.2 KG/M2 | HEIGHT: 70 IN | DIASTOLIC BLOOD PRESSURE: 63 MMHG | OXYGEN SATURATION: 97 % | SYSTOLIC BLOOD PRESSURE: 104 MMHG | WEIGHT: 197 LBS

## 2024-05-09 PROCEDURE — 99205 OFFICE O/P NEW HI 60 MIN: CPT

## 2024-05-09 RX ORDER — METHOTREXATE 2.5 MG/1
2.5 TABLET ORAL
Qty: 24 | Refills: 2 | Status: DISCONTINUED | COMMUNITY
Start: 2023-12-05 | End: 2024-05-09

## 2024-05-09 RX ORDER — METOPROLOL SUCCINATE 50 MG/1
50 TABLET, EXTENDED RELEASE ORAL
Refills: 0 | Status: ACTIVE | COMMUNITY

## 2024-05-09 RX ORDER — HYDROXYCHLOROQUINE SULFATE 200 MG/1
200 TABLET, FILM COATED ORAL
Qty: 180 | Refills: 1 | Status: DISCONTINUED | COMMUNITY
Start: 2023-10-17 | End: 2024-05-09

## 2024-05-09 RX ORDER — FOLIC ACID 1 MG/1
1 TABLET ORAL DAILY
Qty: 30 | Refills: 2 | Status: DISCONTINUED | COMMUNITY
Start: 2023-12-05 | End: 2024-05-09

## 2024-05-09 RX ORDER — HYDROXYCHLOROQUINE SULFATE 200 MG/1
200 TABLET, FILM COATED ORAL
Qty: 180 | Refills: 0 | Status: DISCONTINUED | COMMUNITY
Start: 2023-07-18 | End: 2024-05-09

## 2024-05-29 NOTE — ASSESSMENT
[FreeTextEntry1] : 63-year-old man referred for rheumatology evaluation.  Patient with severe cervical spine neck pain and limitation in range of motion progressing over the past year.  Patient was previously evaluated by outside rheumatologist, given pannus noted on MRI, raise the question of an inflammatory arthropathy, trial of steroids, methotrexate as well as Plaquenil.  Unclear benefit from DMARDs, possible improvement with steroid dosing.  MRI raise the question of possible inflammatory arthritis such as rheumatoid arthritis, serologies negative, versus possible chondrocalcinosis/CPPD.  Previous DEVEN, rheumatoid factor, CCP, and HLA-B27 negative.  X-ray of the pelvis without evidence of sacroiliitis noted as possible seronegative spondyloarthropathy may present with cervical spine fusion.  At this time, will repeat inflammatory markers, ESR, CRP, will check uric acid, in addition possible CPPD will check CMP, PTH, as well as ferritin with iron TIBC.  Trial of colchicine 0.6 mg twice daily for possible CPPD related arthropathy.  Patient was unable to tolerate steroids in the past due to fluctuations in glucose.  Will check QuantiFERON may consider trial of biologic pending results.  Would recommend follow-up in 3 to 4 weeks to reevaluate response to colchicine.  Further management pending results.

## 2024-05-29 NOTE — HISTORY OF PRESENT ILLNESS
[FreeTextEntry1] : 63 year old man referred for rheumatology evaluation.  Patient with right sided neck pain, since last summer, started May 25, 2023 No injury or trauma History of radiation, for oral cancer Patient s/p surgery, radiation and chemo, 2012  One morning, woke up with terrible pain and could not move head Since that time, has been told C1 and C2 are now attached together, grew into another, now pain all the time Steroids helped for a while, started in August for 1-2 months, pain improved After that, Dr. Elizalde (rheumatologist) referred to neurosurgeon, referred for fusion, patient feels cervical spine already fused, not interested in neurosurgery at this time  Patient is not taking anything for pain Was taking oxycontin for pain but no longer helped  Had pneumonia in January, had   Originally from [facility]  Reports hands, shoulders, arms become numb and neck No history of tuberculosis  June 2023: HLA B27 neg  Treated intially with MTX, Took for months did not feel benefit Januvia glipizide synthroid metoprolol  No colchicine Took plaquenil, MTX and prednisone  No history of psoriasis Mother with arthritis, was   Had shoulder reconstruction left three years ago at [facility] for RTC left knee surgery from fracture in 1992 from accident  One year ago reports had full rom of the cervical spine  Treated with epidural x 1 which helped for about 3 days No joint symptoms in the lower extremities Does report some low back pain as well Imaging scanned in system

## 2024-05-29 NOTE — DATA REVIEWED
[FreeTextEntry1] : June 9, 2023 MRI cervical spine No compression fracture Mild dextroscoliosis and straightening of the cervical lordosis.  Anatomic alignment of facet joints.  Diffuse degenerative disc desiccation.  Disc heights are maintained.  Cord normal in signal.  No epidural mass or fluid collection. Atlantooccipital articulation unremarkable.  Atlantal dens interval maintained. C3-C4 moderate canal stenosis and flattening of the left ventral hemicord.  Broad-based central and left paracentral disc osteophyte complex.  At C4-C5 and C5-C6 mild to moderate canal stenosis and cord flattening due to broad-based disc osteophyte complexes.  C6-C7 slight effacement of the thecal sac due to shallow Central disc protrusion.  Multilevel foraminal narrowing most significant at C3-C4 on the left and C4-C5 on the right.  Impingement on the respective except exiting left C4 and right C5 nerve root suspect  From Neurosurgery note: MRI cervical spine done in 11/2023 reviewed by Dr. Triplett with patient and patient's wife, demonstrates abnormal signal in the odontoid with prominence of the adjacent soft tissues, anterior subluxation of C1, and superior subluxation of the odontoid with mass effect upon the cervical medullary junction. Findings have progressed from prior study and metastatic disease cannot be excluded. Findings on the MRI can be related to prior RT treatment, ?radionecrosis recommend CT head without contrast for further evaluation of subluxation of odontoid and C2 recommend MRI cervical spine with and without contrast in 3 months (February/March 2024) to evaluate stability of findings and rule out underlying metastatic disease

## 2024-05-29 NOTE — PHYSICAL EXAM
[General Appearance - Alert] : alert [Sclera] : the sclera and conjunctiva were normal [Respiration, Rhythm And Depth] : normal respiratory rhythm and effort [Exaggerated Use Of Accessory Muscles For Inspiration] : no accessory muscle use [] : no rash [Oriented To Time, Place, And Person] : oriented to person, place, and time [Impaired Insight] : insight and judgment were intact [Affect] : the affect was normal [Mood] : the mood was normal [FreeTextEntry1] : no psoriasis

## 2024-05-29 NOTE — REVIEW OF SYSTEMS
[Feeling Poorly] : feeling poorly [Arthralgias] : arthralgias [Limb Weakness] : limb weakness [Negative] : Heme/Lymph [FreeTextEntry9] : shoulders [de-identified] : upper extremities

## 2024-05-31 ENCOUNTER — APPOINTMENT (OUTPATIENT)
Dept: RHEUMATOLOGY | Facility: CLINIC | Age: 64
End: 2024-05-31
Payer: MEDICAID

## 2024-05-31 DIAGNOSIS — N72 INFLAMMATORY DISEASE OF CERVIX UTERI: ICD-10-CM

## 2024-05-31 DIAGNOSIS — M54.2 CERVICALGIA: ICD-10-CM

## 2024-05-31 DIAGNOSIS — M43.6 TORTICOLLIS: ICD-10-CM

## 2024-05-31 DIAGNOSIS — M19.90 UNSPECIFIED OSTEOARTHRITIS, UNSPECIFIED SITE: ICD-10-CM

## 2024-05-31 LAB
ALBUMIN SERPL ELPH-MCNC: 4.6 G/DL
ALP BLD-CCNC: 76 U/L
ALT SERPL-CCNC: 14 U/L
ANION GAP SERPL CALC-SCNC: 11 MMOL/L
AST SERPL-CCNC: 13 U/L
BASOPHILS # BLD AUTO: 0.09 K/UL
BASOPHILS NFR BLD AUTO: 1 %
BILIRUB SERPL-MCNC: 0.4 MG/DL
BUN SERPL-MCNC: 18 MG/DL
CALCIUM SERPL-MCNC: 10.1 MG/DL
CALCIUM SERPL-MCNC: 10.1 MG/DL
CHLORIDE SERPL-SCNC: 101 MMOL/L
CO2 SERPL-SCNC: 27 MMOL/L
CREAT SERPL-MCNC: 0.89 MG/DL
CRP SERPL-MCNC: <3 MG/L
EGFR: 98 ML/MIN/1.73M2
EOSINOPHIL # BLD AUTO: 0.48 K/UL
EOSINOPHIL NFR BLD AUTO: 5.2 %
ERYTHROCYTE [SEDIMENTATION RATE] IN BLOOD BY WESTERGREN METHOD: 19 MM/HR
FERRITIN SERPL-MCNC: 393 NG/ML
GLUCOSE SERPL-MCNC: 205 MG/DL
HCT VFR BLD CALC: 46.6 %
HGB BLD-MCNC: 14.7 G/DL
IMM GRANULOCYTES NFR BLD AUTO: 0.1 %
IRON SATN MFR SERPL: 32 %
IRON SERPL-MCNC: 90 UG/DL
LYMPHOCYTES # BLD AUTO: 1.63 K/UL
LYMPHOCYTES NFR BLD AUTO: 17.8 %
M TB IFN-G BLD-IMP: NEGATIVE
MAN DIFF?: NORMAL
MCHC RBC-ENTMCNC: 28 PG
MCHC RBC-ENTMCNC: 31.5 GM/DL
MCV RBC AUTO: 88.8 FL
MONOCYTES # BLD AUTO: 0.96 K/UL
MONOCYTES NFR BLD AUTO: 10.5 %
NEUTROPHILS # BLD AUTO: 5.98 K/UL
NEUTROPHILS NFR BLD AUTO: 65.4 %
PARATHYROID HORMONE INTACT: 13 PG/ML
PLATELET # BLD AUTO: 312 K/UL
POTASSIUM SERPL-SCNC: 4.5 MMOL/L
PROT SERPL-MCNC: 7.2 G/DL
QUANTIFERON TB PLUS MITOGEN MINUS NIL: 6.81 IU/ML
QUANTIFERON TB PLUS NIL: 0.04 IU/ML
QUANTIFERON TB PLUS TB1 MINUS NIL: 0 IU/ML
QUANTIFERON TB PLUS TB2 MINUS NIL: 0 IU/ML
RBC # BLD: 5.25 M/UL
RBC # FLD: 14.1 %
SODIUM SERPL-SCNC: 138 MMOL/L
TIBC SERPL-MCNC: 277 UG/DL
UIBC SERPL-MCNC: 187 UG/DL
URATE SERPL-MCNC: 5.6 MG/DL
WBC # FLD AUTO: 9.15 K/UL

## 2024-05-31 PROCEDURE — G2211 COMPLEX E/M VISIT ADD ON: CPT | Mod: NC,1L

## 2024-05-31 PROCEDURE — 99443: CPT

## 2024-05-31 RX ORDER — COLCHICINE 0.6 MG/1
0.6 TABLET ORAL TWICE DAILY
Qty: 60 | Refills: 1 | Status: DISCONTINUED | COMMUNITY
Start: 2024-05-09 | End: 2024-05-31

## 2024-06-04 PROBLEM — M54.2 CERVICAL PAIN: Status: ACTIVE | Noted: 2023-06-30

## 2024-06-04 PROBLEM — M54.2 NECK PAIN: Status: ACTIVE | Noted: 2023-07-01

## 2024-06-04 PROBLEM — M43.6 NECK STIFFNESS: Status: ACTIVE | Noted: 2023-07-01

## 2024-06-04 PROBLEM — N72 CERVICAL INFLAMMATION: Status: ACTIVE | Noted: 2023-07-05

## 2024-06-04 NOTE — HISTORY OF PRESENT ILLNESS
[FreeTextEntry1] : 63 year old man referred for rheumatology evaluation.  Patient with right sided neck pain, since last summer, started May 25, 2023 No injury or trauma History of radiation, for oral cancer Patient s/p surgery, radiation and chemo, 2012  One morning, woke up with terrible pain and could not move head Since that time, has been told C1 and C2 are now attached together, grew into another, now pain all the time Steroids helped for a while, started in August for 1-2 months, pain improved After that, Dr. Elizalde (rheumatologist) referred to neurosurgeon, referred for fusion, patient feels cervical spine already fused, not interested in neurosurgery at this time  Patient is not taking anything for pain Was taking oxycontin for pain but no longer helped  Had pneumonia in January, had   Originally from [facility]  Reports hands, shoulders, arms become numb and neck No history of tuberculosis  June 2023: HLA B27 neg  Treated intially with MTX, Took for months did not feel benefit Januvia glipizide synthroid metoprolol  No colchicine Took plaquenil, MTX and prednisone  No history of psoriasis Mother with arthritis, was   Had shoulder reconstruction left three years ago at [facility] for RTC left knee surgery from fracture in 1992 from accident  One year ago reports had full rom of the cervical spine  Treated with epidural x 1 which helped for about 3 days No joint symptoms in the lower extremities Does report some low back pain as well Imaging scanned in system  May 31, 2024 Patient returns for follow up via telephonic visit Discussed with patient.  You have chosen to receive care through the use of tele-media.  Telemedia enables health care providers at different locations to provide safe, effective, and convenient care through the use of technology.  Please note this is a billable encounter.  Patient understands that I cannot perform a physical exam and that patient may need to come to the clinic to complete the assessment.  Patient agreed verbally to understanding the risks and benefits of telemedia as explained. Patient is located in [place], provider is located in [place]. Patient was started on colchicine at last visit, +GI symptoms, tkaing once per dya Minimal benefit noted Reviewed previous treatments, felt some benefit with steroids in past, no benefit with MTX or plaquenil noted Reviewed lab results Reviewed imaging Given steroid responsive and inflammatory changes noted on images, discussed trial of biologic, trial of enbrel for three months, if without benefit, would discontinue Discussed risks and benefits QuantiFERON negative Will need hepatitis B and C testing prior to starting

## 2024-06-04 NOTE — ASSESSMENT
[FreeTextEntry1] : 63-year-old man referred for rheumatology evaluation.  Patient with severe cervical spine neck pain and limitation in range of motion progressing over the past year.  Patient was previously evaluated by outside rheumatologist, given pannus noted on MRI, raise the question of an inflammatory arthropathy, trial of steroids, methotrexate as well as Plaquenil.  Unclear benefit from DMARDs, possible improvement with steroid dosing.  MRI raise the question of possible inflammatory arthritis such as rheumatoid arthritis, serologies negative, versus possible chondrocalcinosis/CPPD.  Previous DEVEN, rheumatoid factor, CCP, and HLA-B27 negative.  X-ray of the pelvis without evidence of sacroiliitis noted as possible seronegative spondyloarthropathy may present with cervical spine fusion.  Trial of colchicine 0.6 mg twice daily for possible CPPD related arthropathy with minimal benefit.  Improvement with steroids in past but unable to tolerate steroids in the past due to fluctuations in glucose. Trial of enbrel for three months, QuantiFERON negative, reviewed risks and benefits, hepatitis B screening ordered prior to starting enbrel.

## 2024-06-06 LAB
HBV CORE IGG+IGM SER QL: NONREACTIVE
HBV SURFACE AB SER QL: NONREACTIVE
HBV SURFACE AG SER QL: NONREACTIVE
HCV AB SER QL: NONREACTIVE
HCV S/CO RATIO: 0.18 S/CO

## 2024-06-20 RX ORDER — ETANERCEPT 50 MG/ML
50 SOLUTION SUBCUTANEOUS
Qty: 1 | Refills: 2 | Status: ACTIVE | COMMUNITY
Start: 2024-05-31

## 2024-10-29 DIAGNOSIS — R94.8 ABNORMAL RESULTS OF FUNCTION STUDIES OF OTHER ORGANS AND SYSTEMS: ICD-10-CM

## 2024-10-29 DIAGNOSIS — M47.22 OTHER SPONDYLOSIS WITH RADICULOPATHY, CERVICAL REGION: ICD-10-CM

## 2024-10-29 DIAGNOSIS — S12.9XXA FRACTURE OF NECK, UNSPECIFIED, INITIAL ENCOUNTER: ICD-10-CM

## 2024-10-29 DIAGNOSIS — M47.812 SPONDYLOSIS W/OUT MYELOPATHY OR RADICULOPATHY, CERVICAL REGION: ICD-10-CM

## 2025-01-30 NOTE — ED ADULT NURSE NOTE - SUICIDE SCREENING DEPRESSION
Medication: losartan 25 mg passed protocol.   Last office visit date: 01/17/2025  Next appointment scheduled?: Yes   Number of refills given: 1  
Negative

## 2025-04-08 NOTE — ED PROVIDER NOTE - NSTIMEPROVIDERCAREINITIATE_GEN_ER
Copied from CRM #[id number]. Topic: MW Referral/Order - MW Referral/Order Request  >> Apr 8, 2025 10:06 AM Jazmin DURBIN wrote:  Kar Zuñiga called regarding a Referral (or Service to Order).      New referral/ service-to order requested have NOT discussed with provider; declined scheduling appointment.  Selected 'Wrap Up CRM' and created new Telephone Encounter after clicking 'Convert to Clinical Call'. Selected reason for call 'Referral'. Sent Referral message and routed as routine priority per Clinician KB page to appropriate clinician Pool.  -- DO NOT REPLY / DO NOT REPLY ALL --  -- This inbox is not monitored. If this was sent to the wrong provider or department, reroute message to P ECO Reroute pool. --  -- Message is from Engagement Center Operations (ECO) --        Referral Request  Name of Specialist:   Provider's specialty: Neuro Otology    Medical condition for referral:  Patient is having dizziness    Is this a NEW request?: yes      Referral ordered by: Patient      Insurance type:       Payor: [employer] EMPLOYEE / Plan: [facility] EMP PREF / Product Type:  EMPLOYEE-PREFERRED    Preferred Delivery Method  Call when ready for pickup - phone number to notify: [phone]    Caller Information       Contact Date/Time Type Contact Phone/Fax    04/08/2025 10:05 AM CDT Phone (Incoming) Kar Zuñiga [phone]            Alternative phone number:     Can a detailed message be left?  Yes - Voicemail     Patient has been advised the message will be addressed within 2-3 business days        21-Dec-2020 04:46